# Patient Record
Sex: MALE | Race: OTHER | NOT HISPANIC OR LATINO | Employment: FULL TIME | URBAN - METROPOLITAN AREA
[De-identification: names, ages, dates, MRNs, and addresses within clinical notes are randomized per-mention and may not be internally consistent; named-entity substitution may affect disease eponyms.]

---

## 2019-11-18 ENCOUNTER — HOSPITAL ENCOUNTER (INPATIENT)
Facility: HOSPITAL | Age: 44
LOS: 1 days | Discharge: HOME | End: 2019-11-19
Attending: INTERNAL MEDICINE | Admitting: INTERNAL MEDICINE

## 2019-11-18 ENCOUNTER — APPOINTMENT (OUTPATIENT)
Dept: CARDIOLOGY | Facility: HOSPITAL | Age: 44
End: 2019-11-18

## 2019-11-18 DIAGNOSIS — I21.29 POSTERIOR MI (HCC): Primary | ICD-10-CM

## 2019-11-18 LAB
ANION GAP SERPL CALCULATED.3IONS-SCNC: 13.8 MMOL/L (ref 5–15)
AORTIC DIMENSIONLESS INDEX: 0.8 (DI)
BH CV ECHO MEAS - ACS: 1.8 CM
BH CV ECHO MEAS - AO MAX PG (FULL): 2.5 MMHG
BH CV ECHO MEAS - AO MAX PG: 5.1 MMHG
BH CV ECHO MEAS - AO MEAN PG (FULL): 1 MMHG
BH CV ECHO MEAS - AO MEAN PG: 3 MMHG
BH CV ECHO MEAS - AO ROOT AREA (BSA CORRECTED): 1.6
BH CV ECHO MEAS - AO ROOT AREA: 8 CM^2
BH CV ECHO MEAS - AO ROOT DIAM: 3.2 CM
BH CV ECHO MEAS - AO V2 MAX: 113 CM/SEC
BH CV ECHO MEAS - AO V2 MEAN: 82.1 CM/SEC
BH CV ECHO MEAS - AO V2 VTI: 24.9 CM
BH CV ECHO MEAS - AVA(I,A): 2.8 CM^2
BH CV ECHO MEAS - AVA(I,D): 2.8 CM^2
BH CV ECHO MEAS - AVA(V,A): 2.5 CM^2
BH CV ECHO MEAS - AVA(V,D): 2.5 CM^2
BH CV ECHO MEAS - BSA(HAYCOCK): 2 M^2
BH CV ECHO MEAS - BSA: 2 M^2
BH CV ECHO MEAS - BZI_BMI: 25.9 KILOGRAMS/M^2
BH CV ECHO MEAS - BZI_METRIC_HEIGHT: 178 CM
BH CV ECHO MEAS - BZI_METRIC_WEIGHT: 82 KG
BH CV ECHO MEAS - EDV(CUBED): 117.6 ML
BH CV ECHO MEAS - EDV(MOD-SP2): 62 ML
BH CV ECHO MEAS - EDV(MOD-SP4): 48 ML
BH CV ECHO MEAS - EDV(TEICH): 112.8 ML
BH CV ECHO MEAS - EF(CUBED): 77.1 %
BH CV ECHO MEAS - EF(MOD-BP): 63 %
BH CV ECHO MEAS - EF(MOD-SP2): 62.9 %
BH CV ECHO MEAS - EF(MOD-SP4): 68.8 %
BH CV ECHO MEAS - EF(TEICH): 69 %
BH CV ECHO MEAS - ESV(CUBED): 27 ML
BH CV ECHO MEAS - ESV(MOD-SP2): 23 ML
BH CV ECHO MEAS - ESV(MOD-SP4): 15 ML
BH CV ECHO MEAS - ESV(TEICH): 35 ML
BH CV ECHO MEAS - FS: 38.8 %
BH CV ECHO MEAS - IVS/LVPW: 1.1
BH CV ECHO MEAS - IVSD: 1 CM
BH CV ECHO MEAS - LAT PEAK E' VEL: 7.7 CM/SEC
BH CV ECHO MEAS - LV DIASTOLIC VOL/BSA (35-75): 24 ML/M^2
BH CV ECHO MEAS - LV MASS(C)D: 164.3 GRAMS
BH CV ECHO MEAS - LV MASS(C)DI: 82.1 GRAMS/M^2
BH CV ECHO MEAS - LV MAX PG: 2.6 MMHG
BH CV ECHO MEAS - LV MEAN PG: 2 MMHG
BH CV ECHO MEAS - LV SYSTOLIC VOL/BSA (12-30): 7.5 ML/M^2
BH CV ECHO MEAS - LV V1 MAX: 80.5 CM/SEC
BH CV ECHO MEAS - LV V1 MEAN: 58.5 CM/SEC
BH CV ECHO MEAS - LV V1 VTI: 20 CM
BH CV ECHO MEAS - LVIDD: 4.9 CM
BH CV ECHO MEAS - LVIDS: 3 CM
BH CV ECHO MEAS - LVLD AP2: 7.9 CM
BH CV ECHO MEAS - LVLD AP4: 6.6 CM
BH CV ECHO MEAS - LVLS AP2: 6.5 CM
BH CV ECHO MEAS - LVLS AP4: 4 CM
BH CV ECHO MEAS - LVOT AREA (M): 3.5 CM^2
BH CV ECHO MEAS - LVOT AREA: 3.5 CM^2
BH CV ECHO MEAS - LVOT DIAM: 2.1 CM
BH CV ECHO MEAS - LVPWD: 0.9 CM
BH CV ECHO MEAS - MED PEAK E' VEL: 7.07 CM/SEC
BH CV ECHO MEAS - MV A DUR: 145 SEC
BH CV ECHO MEAS - MV A MAX VEL: 98 CM/SEC
BH CV ECHO MEAS - MV DEC SLOPE: 601 CM/SEC^2
BH CV ECHO MEAS - MV DEC TIME: 180 SEC
BH CV ECHO MEAS - MV E MAX VEL: 72.3 CM/SEC
BH CV ECHO MEAS - MV E/A: 0.74
BH CV ECHO MEAS - MV MAX PG: 3.1 MMHG
BH CV ECHO MEAS - MV MEAN PG: 1 MMHG
BH CV ECHO MEAS - MV P1/2T MAX VEL: 84.2 CM/SEC
BH CV ECHO MEAS - MV P1/2T: 41 MSEC
BH CV ECHO MEAS - MV V2 MAX: 87.5 CM/SEC
BH CV ECHO MEAS - MV V2 MEAN: 55.6 CM/SEC
BH CV ECHO MEAS - MV V2 VTI: 22.9 CM
BH CV ECHO MEAS - MVA P1/2T LCG: 2.6 CM^2
BH CV ECHO MEAS - MVA(P1/2T): 5.4 CM^2
BH CV ECHO MEAS - MVA(VTI): 3 CM^2
BH CV ECHO MEAS - PA ACC TIME: 0.06 SEC
BH CV ECHO MEAS - PA MAX PG (FULL): -0.36 MMHG
BH CV ECHO MEAS - PA MAX PG: 3.1 MMHG
BH CV ECHO MEAS - PA PR(ACCEL): 50.7 MMHG
BH CV ECHO MEAS - PA V2 MAX: 88.2 CM/SEC
BH CV ECHO MEAS - PVA(V,A): 3.3 CM^2
BH CV ECHO MEAS - PVA(V,D): 3.3 CM^2
BH CV ECHO MEAS - QP/QS: 0.89
BH CV ECHO MEAS - RAP SYSTOLE: 3 MMHG
BH CV ECHO MEAS - RV MAX PG: 3.5 MMHG
BH CV ECHO MEAS - RV MEAN PG: 2 MMHG
BH CV ECHO MEAS - RV V1 MAX: 93.1 CM/SEC
BH CV ECHO MEAS - RV V1 MEAN: 60.9 CM/SEC
BH CV ECHO MEAS - RV V1 VTI: 19.7 CM
BH CV ECHO MEAS - RVOT AREA: 3.1 CM^2
BH CV ECHO MEAS - RVOT DIAM: 2 CM
BH CV ECHO MEAS - RVSP: 17 MMHG
BH CV ECHO MEAS - SI(AO): 100.1 ML/M^2
BH CV ECHO MEAS - SI(CUBED): 45.3 ML/M^2
BH CV ECHO MEAS - SI(LVOT): 34.6 ML/M^2
BH CV ECHO MEAS - SI(MOD-SP2): 19.5 ML/M^2
BH CV ECHO MEAS - SI(MOD-SP4): 16.5 ML/M^2
BH CV ECHO MEAS - SI(TEICH): 38.9 ML/M^2
BH CV ECHO MEAS - SV(AO): 200.3 ML
BH CV ECHO MEAS - SV(CUBED): 90.6 ML
BH CV ECHO MEAS - SV(LVOT): 69.3 ML
BH CV ECHO MEAS - SV(MOD-SP2): 39 ML
BH CV ECHO MEAS - SV(MOD-SP4): 33 ML
BH CV ECHO MEAS - SV(RVOT): 61.9 ML
BH CV ECHO MEAS - SV(TEICH): 77.8 ML
BH CV ECHO MEAS - TAPSE (>1.6): 2.4 CM2
BH CV ECHO MEAS - TR MAX PG: 14
BH CV ECHO MEAS - TR MAX VEL: 189 CM/SEC
BH CV ECHO MEASUREMENTS AVERAGE E/E' RATIO: 9.79
BH CV XLRA - RV BASE: 3 CM
BH CV XLRA - TDI S': 11 CM/SEC
BUN BLD-MCNC: 5 MG/DL (ref 6–20)
BUN/CREAT SERPL: 6.8 (ref 7–25)
CALCIUM SPEC-SCNC: 8.5 MG/DL (ref 8.6–10.5)
CHLORIDE SERPL-SCNC: 100 MMOL/L (ref 98–107)
CO2 SERPL-SCNC: 24.2 MMOL/L (ref 22–29)
CREAT BLD-MCNC: 0.74 MG/DL (ref 0.76–1.27)
GFR SERPL CREATININE-BSD FRML MDRD: 116 ML/MIN/1.73
GLUCOSE BLD-MCNC: 134 MG/DL (ref 65–99)
GLUCOSE BLDC GLUCOMTR-MCNC: 126 MG/DL (ref 70–130)
GLUCOSE BLDC GLUCOMTR-MCNC: 128 MG/DL (ref 70–130)
GLUCOSE BLDC GLUCOMTR-MCNC: 145 MG/DL (ref 70–130)
LEFT ATRIUM VOLUME INDEX: 11.5 ML/M2
LV EF 2D ECHO EST: 63 %
MAGNESIUM SERPL-MCNC: 1.8 MG/DL (ref 1.6–2.6)
MAXIMAL PREDICTED HEART RATE: 178 BPM
POTASSIUM BLD-SCNC: 3.5 MMOL/L (ref 3.5–5.2)
POTASSIUM BLD-SCNC: 3.6 MMOL/L (ref 3.5–5.2)
SODIUM BLD-SCNC: 138 MMOL/L (ref 136–145)
STRESS TARGET HR: 151 BPM

## 2019-11-18 PROCEDURE — 99222 1ST HOSP IP/OBS MODERATE 55: CPT | Performed by: INTERNAL MEDICINE

## 2019-11-18 PROCEDURE — C1894 INTRO/SHEATH, NON-LASER: HCPCS | Performed by: INTERNAL MEDICINE

## 2019-11-18 PROCEDURE — 25010000002 MAGNESIUM SULFATE 2 GM/50ML SOLUTION: Performed by: INTERNAL MEDICINE

## 2019-11-18 PROCEDURE — C1725 CATH, TRANSLUMIN NON-LASER: HCPCS | Performed by: INTERNAL MEDICINE

## 2019-11-18 PROCEDURE — 82962 GLUCOSE BLOOD TEST: CPT

## 2019-11-18 PROCEDURE — C1769 GUIDE WIRE: HCPCS | Performed by: INTERNAL MEDICINE

## 2019-11-18 PROCEDURE — 25010000002 HEPARIN (PORCINE) PER 1000 UNITS: Performed by: INTERNAL MEDICINE

## 2019-11-18 PROCEDURE — 93005 ELECTROCARDIOGRAM TRACING: CPT | Performed by: INTERNAL MEDICINE

## 2019-11-18 PROCEDURE — 83735 ASSAY OF MAGNESIUM: CPT | Performed by: INTERNAL MEDICINE

## 2019-11-18 PROCEDURE — C1874 STENT, COATED/COV W/DEL SYS: HCPCS | Performed by: INTERNAL MEDICINE

## 2019-11-18 PROCEDURE — C1887 CATHETER, GUIDING: HCPCS | Performed by: INTERNAL MEDICINE

## 2019-11-18 PROCEDURE — 93458 L HRT ARTERY/VENTRICLE ANGIO: CPT | Performed by: INTERNAL MEDICINE

## 2019-11-18 PROCEDURE — 84132 ASSAY OF SERUM POTASSIUM: CPT | Performed by: INTERNAL MEDICINE

## 2019-11-18 PROCEDURE — 93010 ELECTROCARDIOGRAM REPORT: CPT | Performed by: INTERNAL MEDICINE

## 2019-11-18 PROCEDURE — 99152 MOD SED SAME PHYS/QHP 5/>YRS: CPT | Performed by: INTERNAL MEDICINE

## 2019-11-18 PROCEDURE — 80048 BASIC METABOLIC PNL TOTAL CA: CPT | Performed by: INTERNAL MEDICINE

## 2019-11-18 PROCEDURE — 027034Z DILATION OF CORONARY ARTERY, ONE ARTERY WITH DRUG-ELUTING INTRALUMINAL DEVICE, PERCUTANEOUS APPROACH: ICD-10-PCS | Performed by: INTERNAL MEDICINE

## 2019-11-18 PROCEDURE — 93306 TTE W/DOPPLER COMPLETE: CPT

## 2019-11-18 PROCEDURE — 92941 PRQ TRLML REVSC TOT OCCL AMI: CPT | Performed by: INTERNAL MEDICINE

## 2019-11-18 PROCEDURE — 0 IOPAMIDOL PER 1 ML: Performed by: INTERNAL MEDICINE

## 2019-11-18 PROCEDURE — B2111ZZ FLUOROSCOPY OF MULTIPLE CORONARY ARTERIES USING LOW OSMOLAR CONTRAST: ICD-10-PCS | Performed by: INTERNAL MEDICINE

## 2019-11-18 PROCEDURE — 25010000002 MIDAZOLAM PER 1 MG: Performed by: INTERNAL MEDICINE

## 2019-11-18 PROCEDURE — 99153 MOD SED SAME PHYS/QHP EA: CPT | Performed by: INTERNAL MEDICINE

## 2019-11-18 PROCEDURE — 85347 COAGULATION TIME ACTIVATED: CPT

## 2019-11-18 PROCEDURE — 93306 TTE W/DOPPLER COMPLETE: CPT | Performed by: INTERNAL MEDICINE

## 2019-11-18 PROCEDURE — 25010000002 MORPHINE PER 10 MG: Performed by: INTERNAL MEDICINE

## 2019-11-18 PROCEDURE — 4A023N7 MEASUREMENT OF CARDIAC SAMPLING AND PRESSURE, LEFT HEART, PERCUTANEOUS APPROACH: ICD-10-PCS | Performed by: INTERNAL MEDICINE

## 2019-11-18 PROCEDURE — C9606 PERC D-E COR REVASC W AMI S: HCPCS | Performed by: INTERNAL MEDICINE

## 2019-11-18 PROCEDURE — 25010000002 FENTANYL CITRATE (PF) 100 MCG/2ML SOLUTION: Performed by: INTERNAL MEDICINE

## 2019-11-18 DEVICE — XIENCE SIERRA™ EVEROLIMUS ELUTING CORONARY STENT SYSTEM 2.50 MM X 28 MM / RAPID-EXCHANGE
Type: IMPLANTABLE DEVICE | Status: FUNCTIONAL
Brand: XIENCE SIERRA™

## 2019-11-18 RX ORDER — HEPARIN SODIUM 1000 [USP'U]/ML
INJECTION, SOLUTION INTRAVENOUS; SUBCUTANEOUS AS NEEDED
Status: DISCONTINUED | OUTPATIENT
Start: 2019-11-18 | End: 2019-11-18 | Stop reason: HOSPADM

## 2019-11-18 RX ORDER — HYDROCODONE BITARTRATE AND ACETAMINOPHEN 5; 325 MG/1; MG/1
1 TABLET ORAL EVERY 4 HOURS PRN
Status: DISCONTINUED | OUTPATIENT
Start: 2019-11-18 | End: 2019-11-19 | Stop reason: HOSPADM

## 2019-11-18 RX ORDER — ACETAMINOPHEN 325 MG/1
650 TABLET ORAL EVERY 4 HOURS PRN
Status: DISCONTINUED | OUTPATIENT
Start: 2019-11-18 | End: 2019-11-19 | Stop reason: HOSPADM

## 2019-11-18 RX ORDER — FENTANYL CITRATE 50 UG/ML
INJECTION, SOLUTION INTRAMUSCULAR; INTRAVENOUS AS NEEDED
Status: DISCONTINUED | OUTPATIENT
Start: 2019-11-18 | End: 2019-11-18 | Stop reason: HOSPADM

## 2019-11-18 RX ORDER — SODIUM CHLORIDE 9 MG/ML
100 INJECTION, SOLUTION INTRAVENOUS CONTINUOUS
Status: ACTIVE | OUTPATIENT
Start: 2019-11-18 | End: 2019-11-18

## 2019-11-18 RX ORDER — MAGNESIUM SULFATE HEPTAHYDRATE 40 MG/ML
2 INJECTION, SOLUTION INTRAVENOUS AS NEEDED
Status: DISCONTINUED | OUTPATIENT
Start: 2019-11-18 | End: 2019-11-19 | Stop reason: HOSPADM

## 2019-11-18 RX ORDER — ALPRAZOLAM 0.25 MG/1
0.25 TABLET ORAL 4 TIMES DAILY PRN
Status: DISCONTINUED | OUTPATIENT
Start: 2019-11-18 | End: 2019-11-19 | Stop reason: HOSPADM

## 2019-11-18 RX ORDER — METOPROLOL TARTRATE 5 MG/5ML
INJECTION INTRAVENOUS AS NEEDED
Status: DISCONTINUED | OUTPATIENT
Start: 2019-11-18 | End: 2019-11-18 | Stop reason: HOSPADM

## 2019-11-18 RX ORDER — LIDOCAINE HYDROCHLORIDE 20 MG/ML
INJECTION, SOLUTION INFILTRATION; PERINEURAL AS NEEDED
Status: DISCONTINUED | OUTPATIENT
Start: 2019-11-18 | End: 2019-11-18 | Stop reason: HOSPADM

## 2019-11-18 RX ORDER — POTASSIUM CHLORIDE 750 MG/1
40 CAPSULE, EXTENDED RELEASE ORAL AS NEEDED
Status: DISCONTINUED | OUTPATIENT
Start: 2019-11-18 | End: 2019-11-19 | Stop reason: HOSPADM

## 2019-11-18 RX ORDER — MORPHINE SULFATE 2 MG/ML
1 INJECTION, SOLUTION INTRAMUSCULAR; INTRAVENOUS EVERY 4 HOURS PRN
Status: DISCONTINUED | OUTPATIENT
Start: 2019-11-18 | End: 2019-11-19 | Stop reason: HOSPADM

## 2019-11-18 RX ORDER — NICOTINE 21 MG/24HR
1 PATCH, TRANSDERMAL 24 HOURS TRANSDERMAL
Status: DISCONTINUED | OUTPATIENT
Start: 2019-11-18 | End: 2019-11-19 | Stop reason: HOSPADM

## 2019-11-18 RX ORDER — MAGNESIUM SULFATE 1 G/100ML
1 INJECTION INTRAVENOUS AS NEEDED
Status: DISCONTINUED | OUTPATIENT
Start: 2019-11-18 | End: 2019-11-19 | Stop reason: HOSPADM

## 2019-11-18 RX ORDER — NALOXONE HCL 0.4 MG/ML
0.4 VIAL (ML) INJECTION
Status: DISCONTINUED | OUTPATIENT
Start: 2019-11-18 | End: 2019-11-19 | Stop reason: HOSPADM

## 2019-11-18 RX ORDER — ASPIRIN 81 MG/1
81 TABLET, CHEWABLE ORAL DAILY
Status: DISCONTINUED | OUTPATIENT
Start: 2019-11-19 | End: 2019-11-19 | Stop reason: HOSPADM

## 2019-11-18 RX ORDER — SODIUM CHLORIDE 9 MG/ML
INJECTION, SOLUTION INTRAVENOUS CONTINUOUS PRN
Status: COMPLETED | OUTPATIENT
Start: 2019-11-18 | End: 2019-11-18

## 2019-11-18 RX ORDER — MAGNESIUM SULFATE HEPTAHYDRATE 40 MG/ML
4 INJECTION, SOLUTION INTRAVENOUS AS NEEDED
Status: DISCONTINUED | OUTPATIENT
Start: 2019-11-18 | End: 2019-11-19 | Stop reason: HOSPADM

## 2019-11-18 RX ORDER — CLOPIDOGREL BISULFATE 75 MG/1
75 TABLET ORAL DAILY
Status: DISCONTINUED | OUTPATIENT
Start: 2019-11-19 | End: 2019-11-19 | Stop reason: HOSPADM

## 2019-11-18 RX ORDER — CLOPIDOGREL BISULFATE 75 MG/1
TABLET ORAL AS NEEDED
Status: DISCONTINUED | OUTPATIENT
Start: 2019-11-18 | End: 2019-11-18 | Stop reason: HOSPADM

## 2019-11-18 RX ORDER — ATORVASTATIN CALCIUM 20 MG/1
40 TABLET, FILM COATED ORAL NIGHTLY
Status: DISCONTINUED | OUTPATIENT
Start: 2019-11-18 | End: 2019-11-19 | Stop reason: HOSPADM

## 2019-11-18 RX ORDER — MIDAZOLAM HYDROCHLORIDE 1 MG/ML
INJECTION INTRAMUSCULAR; INTRAVENOUS AS NEEDED
Status: DISCONTINUED | OUTPATIENT
Start: 2019-11-18 | End: 2019-11-18 | Stop reason: HOSPADM

## 2019-11-18 RX ADMIN — MORPHINE SULFATE 1 MG: 2 INJECTION, SOLUTION INTRAMUSCULAR; INTRAVENOUS at 18:43

## 2019-11-18 RX ADMIN — SODIUM CHLORIDE 100 ML/HR: 9 INJECTION, SOLUTION INTRAVENOUS at 13:27

## 2019-11-18 RX ADMIN — POTASSIUM CHLORIDE 40 MEQ: 750 CAPSULE, EXTENDED RELEASE ORAL at 20:26

## 2019-11-18 RX ADMIN — METOPROLOL TARTRATE 25 MG: 25 TABLET ORAL at 13:27

## 2019-11-18 RX ADMIN — ATORVASTATIN CALCIUM 40 MG: 20 TABLET, FILM COATED ORAL at 20:26

## 2019-11-18 RX ADMIN — MORPHINE SULFATE 1 MG: 2 INJECTION, SOLUTION INTRAMUSCULAR; INTRAVENOUS at 22:44

## 2019-11-18 RX ADMIN — MAGNESIUM SULFATE 2 G: 2 INJECTION INTRAVENOUS at 17:14

## 2019-11-18 RX ADMIN — ALPRAZOLAM 0.25 MG: 0.25 TABLET ORAL at 16:21

## 2019-11-18 RX ADMIN — POTASSIUM CHLORIDE 40 MEQ: 750 CAPSULE, EXTENDED RELEASE ORAL at 17:14

## 2019-11-18 RX ADMIN — HYDROCODONE BITARTRATE AND ACETAMINOPHEN 1 TABLET: 5; 325 TABLET ORAL at 18:31

## 2019-11-18 RX ADMIN — NICOTINE 1 PATCH: 21 PATCH, EXTENDED RELEASE TRANSDERMAL at 14:00

## 2019-11-18 RX ADMIN — HYDROCODONE BITARTRATE AND ACETAMINOPHEN 1 TABLET: 5; 325 TABLET ORAL at 13:34

## 2019-11-18 RX ADMIN — METOPROLOL TARTRATE 25 MG: 25 TABLET ORAL at 20:26

## 2019-11-18 NOTE — PLAN OF CARE
Problem: Patient Care Overview  Goal: Plan of Care Review  Outcome: Ongoing (interventions implemented as appropriate)   11/18/19 1847   Coping/Psychosocial   Plan of Care Reviewed With patient   Plan of Care Review   Progress improving   OTHER   Outcome Summary Patient admitted as STAT flight from [facility] with STEMI. Taken to cath lab, REGINALDO placed to Left Cx. Pt arrived up to CCU hypertensive w/ 2/10 chest pain. Treated with lortab PRN. Given PO lopressor. Right radial site doing well w/ comfort band removed. Nicotine patch applied. Continues to complain repeatedly about not being able to go to park and smoke. Continues to discuss leaving hospital AMA. Continues to complain of vague 2/10 chest pain in middle of chest. Potassium and Magnesium low, being replaced. Having runs of V-tach, longed 25 beats. Dr. Hawkins aware. Continue to monitor in unit.     Goal: Individualization and Mutuality  Outcome: Ongoing (interventions implemented as appropriate)   11/18/19 1847   Individualization   Patient Specific Goals (Include Timeframe) No further chest pain. Stay in hospital. No further arrhythmia.   Patient Specific Interventions Meds and treatments as ordered.       Problem: Pain, Chronic (Adult)  Goal: Identify Related Risk Factors and Signs and Symptoms  Outcome: Outcome(s) achieved Date Met: 11/18/19 11/18/19 1847   Pain, Chronic (Adult)   Related Risk Factors (Chronic Pain) physical disability;procedures/treatments   Signs and Symptoms (Chronic Pain) verbalization of pain/discomfort for a prolonged time period;questions meaning of pain;verbalization of pain descriptors;pacing/restlessness;impaired thought process/concentration;guarding behavior/splinting/limp     Goal: Acceptable Pain/Comfort Level and Functional Ability  Outcome: Ongoing (interventions implemented as appropriate)   11/18/19 1847   Pain, Chronic (Adult)   Acceptable Pain/Comfort Level and Functional Ability making progress toward outcome        Problem: Cardiac Catheterization (Diagnostic/Interventional) (Adult)  Goal: Signs and Symptoms of Listed Potential Problems Will be Absent, Minimized or Managed (Cardiac Catheterization)  Outcome: Ongoing (interventions implemented as appropriate)   11/18/19 1847   Goal/Outcome Evaluation   Problems Assessed (Cardiac Catheterization) all   Problems Present (Cardiac Cath) situational response;cardiopulmonary complications     Goal: Anesthesia/Sedation Recovery  Outcome: Outcome(s) achieved Date Met: 11/18/19 11/18/19 1847   Goal/Outcome Evaluation   Anesthesia/Sedation Recovery criteria met for transfer       Problem: Skin Injury Risk (Adult)  Goal: Skin Health and Integrity  Outcome: Ongoing (interventions implemented as appropriate)   11/18/19 1847   Skin Injury Risk (Adult)   Skin Health and Integrity making progress toward outcome

## 2019-11-18 NOTE — NURSING NOTE
Pt having multiple runs of Ventricular tachycardia - see printed strips. Longest 25 beats.     12 lead EKG done, no change. Stat mag and potassium sent. CLEMENTINA Martell with Dr. Hawkins notified.    Electrolytes low, orders received for replacement. Per CLEMENTINA Oreilly - Dr. Hawkins thinks these are reperfusion arrhythmias, just monitor.

## 2019-11-18 NOTE — H&P
[facility] History And Physical Assessment    Patient Name: Isaac Holley  Age/Sex: 44 y.o. male  : 1975  MRN: [id number]    Date of Hospital Admission: 2019  Date of Encounter Visit: 19  Encounter Provider: Roselyn Hawkins MD  Place of Service: [facility] CARDIOLOGY  Patient Care Team:  Provider, No Known as PCP - General  Provider, No Known as PCP - Family Medicine    Subjective:     Chief Complaint:  Posterior myocardial infarction    History of Present Illness:  Isaac Holley is a 44 y.o. male with a history of tobacco use, untreated hypertension, chronic back pain who presents with a posterior myocardial infarction.     The patient reports he had sudden onset of left sided chest pain that started while he was at work.  He denies doing anything strenuous when the pain started.  He reports similar pain several years ago but nothing recent.  He presented to the [facility] emergency room where an EKG showed evidence of inferolateral ST elevations and anterior ST depressions.  Interventional cardiology was not available at the facility at that time so he was transferred emergently by [employer] to our facility.  Upon arrival he was brought directly to the cardiac catheterization laboratory and coronary angiograms showed 100% occlusion of the distal left circumflex artery for which he underwent drug eluting stent placement.  His remaining coronary arteries showed mild non-obstructive coronary artery disease.      He denies any prior cardiac history.  He reports a prior history of hypertension but he no longer takes medications for this at this time.      Past Medical History:  1. Prior hypertension    Past Surgical History:  1. Back surgery    Home Medications:   No prescription medications.  Takes an herbal pain medication for lower back pain.     Allergies:  No Known Allergies    Past Social History:  Social History     Socioeconomic  History   • Marital status:      Spouse name: Not on file   • Number of children: Not on file   • Years of education: Not on file   • Highest education level: Not on file       Past Family History:  No family history on file.    Review of Systems:   All systems reviewed. Pertinent positives identified in HPI. All other systems are negative.    Objective:   Resp:  [18-22] 20    Intake/Output Summary (Last 24 hours) at 11/18/2019 1244  Last data filed at 11/18/2019 1241  Gross per 24 hour   Intake 600 ml   Output --   Net 600 ml     Body mass index is 26.07 kg/m².      11/18/19  1220   Weight: 82.4 kg (181 lb 10.5 oz)     Weight change:     Physical Exam:   General Appearance:    Alert, cooperative, in no acute distress   Head:    Normocephalic, without obvious abnormality, atraumatic   Eyes:            Lids and lashes normal, conjunctivae and sclerae normal, no   icterus, no pallor, corneas clear, PERRLA   Ears:    Ears appear intact with no abnormalities noted   Neck:   No adenopathy, supple, trachea midline, no thyromegaly, no   carotid bruit, no JVD   Lungs:     Clear to auscultation,respirations regular, even and unlabored    Heart:    Regular rhythm and normal rate, normal S1 and S2, no murmur, no gallop, no rub, no click   Chest Wall:    No abnormalities observed   Abdomen:     Normal bowel sounds, no masses, no organomegaly, soft        non-tender, non-distended, no guarding, no rebound  tenderness   Extremities:   Moves all extremities well, no edema, no cyanosis, no redness   Pulses:   Pulses palpable and equal bilaterally. Normal radial, carotid, femoral, dorsalis pedis and posterior tibial pulses bilaterally. Normal abdominal aorta   Skin:  Psychiatric:   No bleeding, bruising or rash    Alert and oriented x 3, normal mood and affect         Lab Review:   Labs from [facility] reviewed.     I personally viewed and interpreted the patient's EKG    Assessment/Plan:     1. Posterior myocardial infarction.   Status post drug eluting stent placement of the distal left circumflex artery.   2. Coronary artery disease.  Mild non-obstructive disease of the remaining coronary arteries.   3. Hypertension.  Untreated.   4. Tobacco use    -  Continue aspirin and clopidogrel  -  Will start beta blockers and statin  -  Check echocardiogram  -  Start nicotine patch    Roselyn Hawkins MD  11/18/19  12:44 PM

## 2019-11-19 VITALS
HEART RATE: 86 BPM | HEIGHT: 70 IN | TEMPERATURE: 98 F | OXYGEN SATURATION: 97 % | BODY MASS INDEX: 26.57 KG/M2 | WEIGHT: 185.63 LBS | RESPIRATION RATE: 16 BRPM | DIASTOLIC BLOOD PRESSURE: 101 MMHG | SYSTOLIC BLOOD PRESSURE: 134 MMHG

## 2019-11-19 LAB
ACT BLD: 180 SECONDS (ref 82–152)
ACT BLD: 290 SECONDS (ref 82–152)
ACT BLD: 313 SECONDS (ref 82–152)
ANION GAP SERPL CALCULATED.3IONS-SCNC: 9.2 MMOL/L (ref 5–15)
BUN BLD-MCNC: 6 MG/DL (ref 6–20)
BUN/CREAT SERPL: 8.2 (ref 7–25)
CALCIUM SPEC-SCNC: 8.2 MG/DL (ref 8.6–10.5)
CHLORIDE SERPL-SCNC: 105 MMOL/L (ref 98–107)
CHOLEST SERPL-MCNC: 187 MG/DL (ref 0–200)
CO2 SERPL-SCNC: 25.8 MMOL/L (ref 22–29)
CREAT BLD-MCNC: 0.73 MG/DL (ref 0.76–1.27)
DEPRECATED RDW RBC AUTO: 41.4 FL (ref 37–54)
ERYTHROCYTE [DISTWIDTH] IN BLOOD BY AUTOMATED COUNT: 11.9 % (ref 12.3–15.4)
GFR SERPL CREATININE-BSD FRML MDRD: 118 ML/MIN/1.73
GLUCOSE BLD-MCNC: 110 MG/DL (ref 65–99)
HBA1C MFR BLD: 5.33 % (ref 4.8–5.6)
HCT VFR BLD AUTO: 37.5 % (ref 37.5–51)
HDLC SERPL-MCNC: 34 MG/DL (ref 40–60)
HGB BLD-MCNC: 12.7 G/DL (ref 13–17.7)
LDLC SERPL CALC-MCNC: 119 MG/DL (ref 0–100)
LDLC/HDLC SERPL: 3.51 {RATIO}
MAGNESIUM SERPL-MCNC: 2.2 MG/DL (ref 1.6–2.6)
MCH RBC QN AUTO: 31.7 PG (ref 26.6–33)
MCHC RBC AUTO-ENTMCNC: 33.9 G/DL (ref 31.5–35.7)
MCV RBC AUTO: 93.5 FL (ref 79–97)
PLATELET # BLD AUTO: 304 10*3/MM3 (ref 140–450)
PMV BLD AUTO: 10 FL (ref 6–12)
POTASSIUM BLD-SCNC: 4.5 MMOL/L (ref 3.5–5.2)
RBC # BLD AUTO: 4.01 10*6/MM3 (ref 4.14–5.8)
SODIUM BLD-SCNC: 140 MMOL/L (ref 136–145)
TRIGL SERPL-MCNC: 169 MG/DL (ref 0–150)
TROPONIN T SERPL-MCNC: 4.74 NG/ML (ref 0–0.03)
VLDLC SERPL-MCNC: 33.8 MG/DL (ref 5–40)
WBC NRBC COR # BLD: 9.54 10*3/MM3 (ref 3.4–10.8)

## 2019-11-19 PROCEDURE — 85027 COMPLETE CBC AUTOMATED: CPT | Performed by: INTERNAL MEDICINE

## 2019-11-19 PROCEDURE — 83735 ASSAY OF MAGNESIUM: CPT | Performed by: INTERNAL MEDICINE

## 2019-11-19 PROCEDURE — 80048 BASIC METABOLIC PNL TOTAL CA: CPT | Performed by: INTERNAL MEDICINE

## 2019-11-19 PROCEDURE — 80061 LIPID PANEL: CPT | Performed by: INTERNAL MEDICINE

## 2019-11-19 PROCEDURE — 84484 ASSAY OF TROPONIN QUANT: CPT | Performed by: INTERNAL MEDICINE

## 2019-11-19 PROCEDURE — 93010 ELECTROCARDIOGRAM REPORT: CPT | Performed by: INTERNAL MEDICINE

## 2019-11-19 PROCEDURE — 93005 ELECTROCARDIOGRAM TRACING: CPT | Performed by: INTERNAL MEDICINE

## 2019-11-19 PROCEDURE — 99238 HOSP IP/OBS DSCHRG MGMT 30/<: CPT | Performed by: INTERNAL MEDICINE

## 2019-11-19 PROCEDURE — 83036 HEMOGLOBIN GLYCOSYLATED A1C: CPT | Performed by: INTERNAL MEDICINE

## 2019-11-19 RX ORDER — METOPROLOL SUCCINATE 25 MG/1
25 TABLET, EXTENDED RELEASE ORAL
Qty: 30 TABLET | Refills: 1 | Status: SHIPPED | OUTPATIENT
Start: 2019-11-19 | End: 2020-01-30 | Stop reason: SDUPTHER

## 2019-11-19 RX ORDER — ATORVASTATIN CALCIUM 40 MG/1
40 TABLET, FILM COATED ORAL NIGHTLY
Qty: 30 TABLET | Refills: 1 | Status: SHIPPED | OUTPATIENT
Start: 2019-11-19 | End: 2020-01-30 | Stop reason: SDUPTHER

## 2019-11-19 RX ORDER — ASPIRIN 81 MG/1
81 TABLET, CHEWABLE ORAL DAILY
Start: 2019-11-20

## 2019-11-19 RX ORDER — CLOPIDOGREL BISULFATE 75 MG/1
75 TABLET ORAL DAILY
Qty: 30 TABLET | Refills: 11 | Status: SHIPPED | OUTPATIENT
Start: 2019-11-20

## 2019-11-19 RX ORDER — METOPROLOL SUCCINATE 25 MG/1
25 TABLET, EXTENDED RELEASE ORAL
Status: DISCONTINUED | OUTPATIENT
Start: 2019-11-19 | End: 2019-11-19 | Stop reason: HOSPADM

## 2019-11-19 RX ADMIN — CLOPIDOGREL 75 MG: 75 TABLET, FILM COATED ORAL at 08:10

## 2019-11-19 RX ADMIN — NICOTINE 1 PATCH: 21 PATCH, EXTENDED RELEASE TRANSDERMAL at 08:10

## 2019-11-19 RX ADMIN — METOPROLOL SUCCINATE 25 MG: 25 TABLET, FILM COATED, EXTENDED RELEASE ORAL at 08:41

## 2019-11-19 RX ADMIN — ASPIRIN 81 MG: 81 TABLET, CHEWABLE ORAL at 08:10

## 2019-11-19 NOTE — DISCHARGE SUMMARY
[facility] Discharge    Patient Name: Isaac Holley  Age/Sex: 44 y.o. male  : 1975  MRN: [id number]    Encounter Provider: Roselyn Hawkins MD  Referring Provider: Roselyn Hawkins MD  [facility]  Patient Care Team:  Provider, No Known as PCP - General  Provider, No Known as PCP - Family Medicine         Date of Discharge:  2019     Date of Admit: 2019    Discharge Condition: Stable  Discharge Diagnosis:    Acute posterior myocardial infarction (CMS/HCC)      Hospital Course:   This is a 44-year-old with a history of tobacco use, chronic back pain, untreated hypertension, who was admitted with a posterior myocardial infarction.    He presented to [facility] with complaints of sudden onset chest pressure.  On arrival he was noted to have inferolateral ST elevations with anterior ST depressions.  Interventional cardiology was not available at facility at the time so the patient was transferred emergently by [facility] to [facility].  Upon arrival he was brought directly to cardiac catheterization laboratory.  Cardiac catheterization showed evidence of an occluded distal left circumflex vessel for which he underwent drug-eluting stent placement.  His remaining coronary arteries showed mild nonobstructive coronary artery disease.  Echocardiogram following his myocardial infarction showed normal left ventricular systolic function and wall motion with no significant valvular disease.  He did have episodes of nonsustained ventricular tachycardia following his myocardial infraction the longest lasting about 15 beats which were all asymptomatic.    Following his procedure the patient insisted on going outside to smoke.  He stated that he was unable to go without a cigarette for very long.  After explaining the situation the patient was able to stay the first night.  The morning following his  myocardial infarction the patient reported that he would not be able to stay any longer hospital.  I tried discussing this with the patient and discouraged him from leaving the hospital and explained that I cannot let him walk out of the hospital to smoke a cigarette.  The patient was insistent.  He will be discharged AGAINST MEDICAL ADVICE.  Since it is imperative that the patient be on medications for his recent myocardial infarction and stent placement I will provide prescriptions and follow-up appointment for him if he wishes to use them.    He was advised of the possibility of arrhythmias, recurrent stent thrombosis and myocardial infarction as some of the possible complications that could arise.  Was stressed the importance of taking his medications as directed especially his dual antiplatelet therapy.    Objective:  Temp:  [97.8 °F (36.6 °C)-98.9 °F (37.2 °C)] 98.5 °F (36.9 °C)  Heart Rate:  [58-82] 69  Resp:  [10-22] 15  BP: (121-170)/() 122/77    Intake/Output Summary (Last 24 hours) at 11/19/2019 0837  Last data filed at 11/19/2019 0404  Gross per 24 hour   Intake 1970 ml   Output 2000 ml   Net -30 ml     Body mass index is 26.57 kg/m².      11/18/19  1220 11/18/19  1529 11/19/19  0404   Weight: 82.4 kg (181 lb 10.5 oz) 82 kg (180 lb 12.4 oz) 84.2 kg (185 lb 10 oz)     Weight change:     Physical Exam:   General Appearance:    No acute distress, alert and oriented x4   Lungs:     Clear to auscultation bilaterally     Heart:    Regular rhythm and normal rate.  No murmurs, gallops, or       rubs.   Abdomen:     Soft, non-tender, non-distended.    Extremities:   Moves all extremities well.  No clubbing, cyanosis, or edema.       Procedures Performed  Procedure(s):  Left Heart Cath  Coronary angiography  Percutaneous Coronary Intervention       Consults:  Consults     No orders found for last 30 day(s).          Pertinent Test Results:  Results from last 7 days   Lab Units 11/19/19  0403 11/18/19  0003    SODIUM mmol/L 140 138   POTASSIUM mmol/L 4.5 3.6  3.5   CHLORIDE mmol/L 105 100   CO2 mmol/L 25.8 24.2   BUN mg/dL 6 5*   CREATININE mg/dL 0.73* 0.74*   GLUCOSE mg/dL 110* 134*   CALCIUM mg/dL 8.2* 8.5*     Results from last 7 days   Lab Units 11/19/19  0403   TROPONIN T ng/mL 4.740*     Results from last 7 days   Lab Units 11/19/19  0403   WBC 10*3/mm3 9.54   HEMOGLOBIN g/dL 12.7*   HEMATOCRIT % 37.5   PLATELETS 10*3/mm3 304         Results from last 7 days   Lab Units 11/19/19  0403 11/18/19  1554   MAGNESIUM mg/dL 2.2 1.8     Results from last 7 days   Lab Units 11/19/19  0403   CHOLESTEROL mg/dL 187   TRIGLYCERIDES mg/dL 169*   HDL CHOL mg/dL 34*               Discharge Medications     Discharge Medications      New Medications      Instructions Start Date   aspirin 81 MG chewable tablet   81 mg, Oral, Daily   Start Date:  11/20/2019     atorvastatin 40 MG tablet  Commonly known as:  LIPITOR   40 mg, Oral, Nightly      clopidogrel 75 MG tablet  Commonly known as:  PLAVIX   75 mg, Oral, Daily   Start Date:  11/20/2019     metoprolol succinate XL 25 MG 24 hr tablet  Commonly known as:  TOPROL-XL   25 mg, Oral, Every 24 Hours Scheduled         Continue These Medications      Instructions Start Date   NON FORMULARY   KRATOM (OTC)              Discharge Diet:    Dietary Orders (From admission, onward)    Start     Ordered    11/18/19 1317  Diet Regular; Cardiac  Diet Effective Now     Question Answer Comment   Diet Texture / Consistency Regular    Common Modifiers Cardiac        11/18/19 1317          Activity at Discharge:   as instructed    Discharge disposition: home     Discharge Instructions and Follow ups:  No future appointments.  Additional Instructions for the Follow-ups that You Need to Schedule     Ambulatory Referral to Cardiac Rehab   As directed      Discharge Follow-up with Specified Provider: WILL Mistry; 1 Week   As directed      To:  WILL Mistry    Follow Up:  1 Week          Discharge Follow-up with Specified Provider: Dr. Hawkins; 1 Month   As directed      To:  Dr. Hawkins    Follow Up:  1 Month           Follow-up Information     [facility] .    Specialty:  Cardiac Rehabilitation  Contact information:  [address] [facility] [id number] [phone]           Provider, No Known .    Contact information:  [facility] [id number]                      Roselyn Hawkins MD  11/19/19  8:37 AM    Time: Discharge 20 min

## 2019-11-19 NOTE — PAYOR COMM NOTE
"Scarlett Orellana (44 y.o. Male)                    ATTENTION;   CLINICAL FOR NURSE REVIEW, AUTH PENDING [id number]                 REPLY TO [facility] DEPT, DANYELL CAMPBELL N  UR  [phone]         Date of Birth Social Security Number Address Home Phone MRN    1975  [address]  [place] [address] [phone] [id number]    Zoroastrian Marital Status          None        Admission Date Admission Type Admitting Provider Attending Provider Department, Room/Bed    19 Urgent Roselyn Hawkins MD  [facility], N331/1    Discharge Date Discharge Disposition Discharge Destination        2019 Home or Self Care              Attending Provider:  (none)   Allergies:  No Known Allergies    Isolation:  None   Infection:  None   Code Status:  Prior    Ht:  178 cm (70.08\")   Wt:  84.2 kg (185 lb 10 oz)    Admission Cmt:  None   Principal Problem:  None                Active Insurance as of 2019     Primary Coverage     Payor Plan Insurance Group Employer/Plan Group    [place] MEDICAID [facility] MEDICAID [id number]     Payor Plan Address Payor Plan Phone Number Payor Plan Fax Number Effective Dates    [address] [address] [phone]  2019 - None Entered    [facility] [id number]       Subscriber Name Subscriber Birth Date Member ID       SCARLETT ORELLANA 1975 [id number]                 Emergency Contacts      (Rel.) Home Phone Work Phone Mobile Phone    STEVE ORELLANA (Mother) [phone] -- --    LEE MAHMOOD (Brother) [phone] -- --               History & Physical      Roselyn Hawkins MD at 19 [address] [place] [address] Cardiology History And Physical Assessment    Patient Name: Scarlett Orellana  Age/Sex: 44 y.o. male  : 1975  MRN: [id number]    Date of Hospital Admission: 2019  Date of Encounter Visit: 19  Encounter Provider: Roselyn Hawkins MD  Place of Service: [facility] " [place] [facility]  Patient Care Team:  Provider, No Known as PCP - General  Provider, No Known as PCP - Family Medicine    Subjective:     Chief Complaint:  Posterior myocardial infarction    History of Present Illness:  Isaac Holley is a 44 y.o. male with a history of tobacco use, untreated hypertension, chronic back pain who presents with a posterior myocardial infarction.     The patient reports he had sudden onset of left sided chest pain that started while he was at work.  He denies doing anything strenuous when the pain started.  He reports similar pain several years ago but nothing recent.  He presented to the [facility] emergency room where an EKG showed evidence of inferolateral ST elevations and anterior ST depressions.  Interventional cardiology was not available at the facility at that time so he was transferred emergently by [employer] to our facility.  Upon arrival he was brought directly to the cardiac catheterization laboratory and coronary angiograms showed 100% occlusion of the distal left circumflex artery for which he underwent drug eluting stent placement.  His remaining coronary arteries showed mild non-obstructive coronary artery disease.      He denies any prior cardiac history.  He reports a prior history of hypertension but he no longer takes medications for this at this time.      Past Medical History:  1. Prior hypertension    Past Surgical History:  1. Back surgery    Home Medications:   No prescription medications.  Takes an herbal pain medication for lower back pain.     Allergies:  No Known Allergies    Past Social History:  Social History     Socioeconomic History   • Marital status:      Spouse name: Not on file   • Number of children: Not on file   • Years of education: Not on file   • Highest education level: Not on file       Past Family History:  No family history on file.    Review of Systems:   All systems reviewed. Pertinent positives identified  in HPI. All other systems are negative.    Objective:   Resp:  [18-22] 20    Intake/Output Summary (Last 24 hours) at 11/18/2019 1244  Last data filed at 11/18/2019 1241  Gross per 24 hour   Intake 600 ml   Output --   Net 600 ml     Body mass index is 26.07 kg/m².      11/18/19  1220   Weight: 82.4 kg (181 lb 10.5 oz)     Weight change:     Physical Exam:   General Appearance:    Alert, cooperative, in no acute distress   Head:    Normocephalic, without obvious abnormality, atraumatic   Eyes:            Lids and lashes normal, conjunctivae and sclerae normal, no   icterus, no pallor, corneas clear, PERRLA   Ears:    Ears appear intact with no abnormalities noted   Neck:   No adenopathy, supple, trachea midline, no thyromegaly, no   carotid bruit, no JVD   Lungs:     Clear to auscultation,respirations regular, even and unlabored    Heart:    Regular rhythm and normal rate, normal S1 and S2, no murmur, no gallop, no rub, no click   Chest Wall:    No abnormalities observed   Abdomen:     Normal bowel sounds, no masses, no organomegaly, soft        non-tender, non-distended, no guarding, no rebound  tenderness   Extremities:   Moves all extremities well, no edema, no cyanosis, no redness   Pulses:   Pulses palpable and equal bilaterally. Normal radial, carotid, femoral, dorsalis pedis and posterior tibial pulses bilaterally. Normal abdominal aorta   Skin:  Psychiatric:   No bleeding, bruising or rash    Alert and oriented x 3, normal mood and affect         Lab Review:   Labs from [facility] reviewed.     I personally viewed and interpreted the patient's EKG    Assessment/Plan:     1. Posterior myocardial infarction.  Status post drug eluting stent placement of the distal left circumflex artery.   2. Coronary artery disease.  Mild non-obstructive disease of the remaining coronary arteries.   3. Hypertension.  Untreated.   4. Tobacco use    -  Continue aspirin and clopidogrel  -  Will start beta blockers and statin  -   Check echocardiogram  -  Start nicotine patch    Roselyn Hawkins MD  11/18/19  12:44 PM                      Electronically signed by Roselyn Hawkins MD at 11/18/19 1324            Vital Signs (last 3 days) before discharge     Date/Time   Temp   Temp src   Pulse   Resp   BP   Patient Position   SpO2    11/19/19 0841   98 (36.7)   Oral   86   16   134/101  (Abnormal)    Sitting   97    11/19/19 0808   --   --   --   --   --   --   96    11/19/19 0800   --   --   99   --   --   --   97    11/19/19 0700   --   --   69   --   --   --   96    11/19/19 0600   --   --   62   --   122/77   --   96    11/19/19 0500   --   --   62   --   128/86   --   96    11/19/19 0400   --   --   78   --   126/89   --   95    11/19/19 0351   98.5 (36.9)   Oral   --   --   --   --   --    11/19/19 0300   --   --   68   --   143/89   --   98    11/19/19 0200   --   --   58   --   127/84   --   95    11/19/19 0100   --   --   72   --   --   --   96    11/19/19 0000   --   --   61   15   128/88   Lying   95    11/18/19 2346   98.9 (37.2)   --   --   --   --   --   --    11/18/19 2300   --   --   58   --   140/93   --   96    11/18/19 2200   --   --   61   --   129/93   --   96    11/18/19 2100   --   --   61   --   125/85   --   95    11/18/19 2000   --   --   68   15   146/92   Lying   98    11/18/19 1920   --   --   76   --   --   --   97    11/18/19 1900   97.8 (36.6)   Oral   72   --   145/102  (Abnormal)    --   97    11/18/19 1809   --   --   81   --   --   --   98    11/18/19 1800   --   --   73   --   148/95   --   97    11/18/19 1730   --   --   82   --   155/106  (Abnormal)    --   99    11/18/19 1700   --   --   61   --   123/82   --   97    11/18/19 1630   --   --   --   --   121/80   --   --    11/18/19 1625   --   --   76   --   --   --   97    11/18/19 1611   98.6 (37)   Oral   --   --   --   --   --    11/18/19 1600   --   --   67   16   135/89   Lying   97    11/18/19 1530   --   --   --   --   --   --   98    11/18/19 1529    --   --   76   --   128/72   --   --    11/18/19 1500   --   --   --   --   146/104  (Abnormal)    --   --    11/18/19 1437   --   --   68   --   --   --   98    11/18/19 1430   --   --   66   --   127/87   --   97    11/18/19 1400   --   --   72   --   160/117  (Abnormal)    --   98    11/18/19 1345   --   --   --   --   156/112  (Abnormal)    --   --    11/18/19 1336   --   --   76   --   --   --   98    11/18/19 1330   --   --   68   --   142/104  (Abnormal)    --   97    11/18/19 1315   --   --   65   --   142/95   --   96    11/18/19 1305   98.1 (36.7)   Oral   80   10   170/118  (Abnormal)    Lying   95    11/18/19 12:44:34   --   --   --   20   --   --   --    11/18/19 12:39:57   --   --   --   20   --   --   --    11/18/19 12:35:21   --   --   --   20   --   --   --    11/18/19 12:30:50   --   --   --   20   --   --   --    11/18/19 12:25:57   --   --   --   20   --   --   --    11/18/19 12:21:26   --   --   --   20   --   --   --    11/18/19 12:16:43   --   --   --   20   --   --   --    11/18/19 12:12:08   --   --   --   20   --   --   --    11/18/19 12:07:10   --   --   --   20   --   --   --    11/18/19 12:04:03   --   --   --   18   --   --   --    11/18/19 11:58:53   --   --   --   22   --   --   --    11/18/19 11:54:21   --   --   --   20   --   --   --    11/18/19 11:49:36   --   --   --   20   --   --   --    11/18/19 11:44:52   --   --   --   20   --   --   --    11/18/19 11:40:17   --   --   --   20   --   --   --    11/18/19 11:35:13   --   --   --   22   --   --   --              Oxygen Therapy (last 3 days) before discharge     Date/Time   SpO2   Device (Oxygen Therapy)   Flow (L/min)   Oxygen Concentration (%)   ETCO2 (mmHg)    11/19/19 0841   97   room air   --   --   --    11/19/19 0808   96   --   --   --   --    11/19/19 0800   97   room air   --   --   --    11/19/19 0700   96   --   --   --   --    11/19/19 0600   96   --   --   --   --    11/19/19 0500   96   --   --   --   --     11/19/19 0400   95   --   --   --   --    11/19/19 0300   98   --   --   --   --    11/19/19 0200   95   --   --   --   --    11/19/19 0100   96   --   --   --   --    11/19/19 0030   --   room air   --   --   --    11/19/19 0000   95   room air   --   --   --    11/18/19 2300   96   --   --   --   --    11/18/19 2200   96   --   --   --   --    11/18/19 2100   95   --   --   --   --    11/18/19 2034   --   room air   --   --   --    11/18/19 2000   98   room air   --   --   --    11/18/19 1920   97   --   --   --   --    11/18/19 1900   97   --   --   --   --    11/18/19 1809   98   --   --   --   --    11/18/19 1800   97   --   --   --   --    11/18/19 1730   99   --   --   --   --    11/18/19 1700   97   --   --   --   --    11/18/19 1625   97   --   --   --   --    11/18/19 1600   97   room air   --   --   --    11/18/19 1530   98   --   --   --   --    11/18/19 1437   98   --   --   --   --    11/18/19 1430   97   --   --   --   --    11/18/19 1400   98   --   --   --   --    11/18/19 1336   98   --   --   --   --    11/18/19 1330   97   --   --   --   --    11/18/19 1315   96   --   --   --   --    11/18/19 1305   95   room air   --   --   --            Lines, Drains & Airways    Active LDAs     None         Inactive LDAs     Name:   Placement date:   Placement time:   Removal date:   Removal time:   Site:   Days:    [REMOVED] Peripheral IV 11/18/19 0941 Right Antecubital   11/18/19    0941    11/19/19    0830    Antecubital   less than 1    [REMOVED] Peripheral IV 11/18/19 0955 Right Hand   11/18/19    0955    11/19/19    0850    Hand   less than 1    [REMOVED] Arterial Sheath 6 Fr. Right Radial   11/18/19    1141    11/18/19    1243    Radial   less than 1                Hospital Medications (all)       Dose Frequency Start End    sodium chloride 0.9 % infusion  Continuous PRN 11/18/2019 11/18/2019    Sig: Continuous As Needed.    sodium chloride 0.9 % infusion 100 mL/hr Continuous 11/18/2019 11/18/2019     Sig - Route: Infuse 100 mL/hr into a venous catheter Continuous. - Intravenous    acetaminophen (TYLENOL) tablet 650 mg (Discontinued) 650 mg Every 4 Hours PRN 11/18/2019 11/19/2019    Sig - Route: Take 2 tablets by mouth Every 4 (Four) Hours As Needed for Mild Pain  or Fever (temperature greater than 101F). - Oral    Reason for Discontinue: Patient Discharge    ALPRAZolam (XANAX) tablet 0.25 mg (Discontinued) 0.25 mg 4 Times Daily PRN 11/18/2019 11/19/2019    Sig - Route: Take 1 tablet by mouth 4 (Four) Times a Day As Needed for Anxiety. - Oral    Reason for Discontinue: Patient Discharge    aspirin chewable tablet 81 mg (Discontinued) 81 mg Daily 11/19/2019 11/19/2019    Sig - Route: Chew 1 tablet Daily. - Oral    Reason for Discontinue: Patient Discharge    atorvastatin (LIPITOR) tablet 40 mg (Discontinued) 40 mg Nightly 11/18/2019 11/19/2019    Sig - Route: Take 2 tablets by mouth Every Night. - Oral    Reason for Discontinue: Patient Discharge    clopidogrel (PLAVIX) tablet 75 mg (Discontinued) 75 mg Daily 11/19/2019 11/19/2019    Sig - Route: Take 1 tablet by mouth Daily. - Oral    Reason for Discontinue: Patient Discharge    clopidogrel (PLAVIX) tablet (Discontinued)  As Needed 11/18/2019 11/18/2019    Sig: As Needed.    Reason for Discontinue: Patient Discharge    fentaNYL citrate (PF) (SUBLIMAZE) injection (Discontinued)  As Needed 11/18/2019 11/18/2019    Sig: As Needed.    Reason for Discontinue: Patient Discharge    heparin (porcine) injection (Discontinued)  As Needed 11/18/2019 11/18/2019    Sig: As Needed.    Reason for Discontinue: Patient Discharge    HYDROcodone-acetaminophen (NORCO) 5-325 MG per tablet 1 tablet (Discontinued) 1 tablet Every 4 Hours PRN 11/18/2019 11/19/2019    Sig - Route: Take 1 tablet by mouth Every 4 (Four) Hours As Needed for Moderate Pain . - Oral    Reason for Discontinue: Patient Discharge    iopamidol (ISOVUE-370) 76 % injection (Discontinued)  As Needed 11/18/2019  "11/18/2019    Sig: As Needed.    Reason for Discontinue: Patient Discharge    lidocaine (XYLOCAINE) 2% injection (Discontinued)  As Needed 11/18/2019 11/18/2019    Sig: As Needed.    Reason for Discontinue: Patient Discharge    Magnesium Sulfate 2 gram infusion- Mg 1.6 - 1.9 mg/dL (Discontinued) 2 g As Needed 11/18/2019 11/19/2019    Sig - Route: Infuse 50 mL into a venous catheter As Needed (Mg 1.6 - 1.9 mg/dL). - Intravenous    Reason for Discontinue: Patient Discharge    Linked Group 1:  \"Or\" Linked Group Details        magnesium sulfate 3 gram infusion (1gm x 3) - Mg 1.1 - 1.5 mg/dL (Discontinued) 1 g As Needed 11/18/2019 11/19/2019    Sig - Route: Infuse 100 mL into a venous catheter As Needed (Mg 1.1 - 1.5 mg/dL). - Intravenous    Reason for Discontinue: Patient Discharge    Linked Group 1:  \"Or\" Linked Group Details        magnesium sulfate 4 gram infusion - Mg less than or equal to 1mg/dL (Discontinued) 4 g As Needed 11/18/2019 11/19/2019    Sig - Route: Infuse 100 mL into a venous catheter As Needed (Mg less than or equal to 1mg/dL). - Intravenous    Reason for Discontinue: Patient Discharge    Linked Group 1:  \"Or\" Linked Group Details        metoprolol succinate XL (TOPROL-XL) 24 hr tablet 25 mg (Discontinued) 25 mg Every 24 Hours Scheduled 11/19/2019 11/19/2019    Sig - Route: Take 1 tablet by mouth Daily. - Oral    Reason for Discontinue: Patient Discharge    metoprolol tartrate (LOPRESSOR) injection (Discontinued)  As Needed 11/18/2019 11/18/2019    Sig: As Needed.    Reason for Discontinue: Patient Discharge    metoprolol tartrate (LOPRESSOR) tablet 25 mg (Discontinued) 25 mg Every 12 Hours Scheduled 11/18/2019 11/19/2019    Sig - Route: Take 1 tablet by mouth Every 12 (Twelve) Hours. - Oral    midazolam (VERSED) injection (Discontinued)  As Needed 11/18/2019 11/18/2019    Sig: As Needed.    Reason for Discontinue: Patient Discharge    morphine injection 1 mg (Discontinued) 1 mg Every 4 Hours PRN " "11/18/2019 11/19/2019    Sig - Route: Infuse 0.5 mL into a venous catheter Every 4 (Four) Hours As Needed for Severe Pain . - Intravenous    Reason for Discontinue: Patient Discharge    Linked Group 2:  \"And\" Linked Group Details        naloxone (NARCAN) injection 0.4 mg (Discontinued) 0.4 mg Every 5 Minutes PRN 11/18/2019 11/19/2019    Sig - Route: Infuse 1 mL into a venous catheter Every 5 (Five) Minutes As Needed for Respiratory Depression. - Intravenous    Reason for Discontinue: Patient Discharge    Linked Group 2:  \"And\" Linked Group Details        nicotine (NICODERM CQ) 21 MG/24HR patch 1 patch (Discontinued) 1 patch Every 24 Hours Scheduled 11/18/2019 11/19/2019    Sig - Route: Place 1 patch on the skin as directed by provider Daily. - Transdermal    Reason for Discontinue: Patient Discharge    nitroGLYCERIN 200 mcg/mL 30 mL syringe (Discontinued)  As Needed 11/18/2019 11/18/2019    Sig: As Needed.    Reason for Discontinue: Patient Discharge    potassium chloride (MICRO-K) CR capsule 40 mEq (Discontinued) 40 mEq As Needed 11/18/2019 11/19/2019    Sig - Route: Take 4 capsules by mouth As Needed (Potassium Replacement.  See Admin Instructions). - Oral    Reason for Discontinue: Patient Discharge    verapamil (ISOPTIN) 500 mcg, nitroglycerin (TRIDIL) 200 mcg, heparin (porcine) 3,000 Units radial artery injection (Discontinued)  As Needed 11/18/2019 11/18/2019    Sig: As Needed.    Reason for Discontinue: Patient Discharge          Orders (last 72 hrs)     Start     Ordered    11/20/19 0600  Troponin  Morning Draw,   Status:  Canceled      11/19/19 0746    11/20/19 0500  ECG 12 Lead  Tomorrow AM,   Status:  Canceled      11/19/19 0746    11/20/19 0000  aspirin 81 MG chewable tablet  Daily      11/19/19 0835    11/20/19 0000  clopidogrel (PLAVIX) 75 MG tablet  Daily      11/19/19 0835    11/19/19 0900  clopidogrel (PLAVIX) tablet 75 mg  Daily,   Status:  Discontinued      11/18/19 1316    11/19/19 0900  aspirin " chewable tablet 81 mg  Daily,   Status:  Discontinued      11/18/19 1316    11/19/19 0900  metoprolol succinate XL (TOPROL-XL) 24 hr tablet 25 mg  Every 24 Hours Scheduled,   Status:  Discontinued      11/19/19 0746    11/19/19 0834  Discontinue IV  Once,   Status:  Canceled      11/19/19 0835    11/19/19 0833  Discharge patient  Once      11/19/19 0835    11/19/19 0700  ECG 12 Lead  Once      11/18/19 1316    11/19/19 0616  POC Activated Clotting Time  Once      11/18/19 1201    11/19/19 0616  POC Activated Clotting Time  Once      11/18/19 1229    11/19/19 0615  POC Activated Clotting Time  Once      11/18/19 1147    11/19/19 0600  CBC (No Diff)  Morning Draw      11/18/19 1316    11/19/19 0600  Basic Metabolic Panel  Morning Draw,   Status:  Canceled      11/18/19 1316    11/19/19 0600  Hemoglobin A1c  Morning Draw      11/18/19 1316    11/19/19 0600  Lipid Panel  Morning Draw     Comments:  Fasting      11/18/19 1316    11/19/19 0600  Troponin  Morning Draw      11/18/19 1316    11/19/19 0600  Magnesium  Morning Draw     Comments:  Electrolyte replacement protocol      11/18/19 1700    11/19/19 0000  atorvastatin (LIPITOR) 40 MG tablet  Nightly      11/19/19 0835    11/19/19 0000  metoprolol succinate XL (TOPROL-XL) 25 MG 24 hr tablet  Every 24 Hours Scheduled      11/19/19 0835    11/19/19 0000  Discharge Follow-up with Specified Provider: WILL Mistry; 1 Week      11/19/19 0835    11/19/19 0000  Discharge Follow-up with Specified Provider: Dr. Hawkins; 1 Month      11/19/19 0835    11/18/19 2100  atorvastatin (LIPITOR) tablet 40 mg  Nightly,   Status:  Discontinued      11/18/19 1316    11/18/19 1947  POC Glucose Once  Once      11/18/19 1945    11/18/19 1701  Patient Currently On Electrolyte Replacement Protocol - Please Refer to MAR for Protocol Details  Misc Nursing Order (Specify)  Daily,   Status:  Canceled     Comments:  Patient Currently On Electrolyte Replacement Protocol - Please Refer to MAR for  Protocol Details    11/18/19 1700    11/18/19 1701  Basic Metabolic Panel  Daily,   Status:  Canceled     Comments:  Electrolyte replacement protocol      11/18/19 1700    11/18/19 1659  magnesium sulfate 4 gram infusion - Mg less than or equal to 1mg/dL  As Needed,   Status:  Discontinued      11/18/19 1700    11/18/19 1659  magnesium sulfate 3 gram infusion (1gm x 3) - Mg 1.1 - 1.5 mg/dL  As Needed,   Status:  Discontinued      11/18/19 1700 11/18/19 1659  Magnesium Sulfate 2 gram infusion- Mg 1.6 - 1.9 mg/dL  As Needed,   Status:  Discontinued      11/18/19 1700    11/18/19 1659  potassium chloride (MICRO-K) CR capsule 40 mEq  As Needed,   Status:  Discontinued      11/18/19 1700 11/18/19 1659  Magnesium  As Needed,   Status:  Canceled      11/18/19 1700    11/18/19 1659  Potassium  As Needed,   Status:  Canceled      11/18/19 1700    11/18/19 1612  POC Glucose Once  Once      11/18/19 1610    11/18/19 1600  Strict intake and output  Every 4 Hours,   Status:  Canceled      11/18/19 1316    11/18/19 1551  Potassium  STAT      11/18/19 1550    11/18/19 1551  Magnesium  STAT      11/18/19 1550    11/18/19 1551  ECG 12 Lead  STAT      11/18/19 1550    11/18/19 1446  ALPRAZolam (XANAX) tablet 0.25 mg  4 Times Daily PRN,   Status:  Discontinued      11/18/19 1446    11/18/19 1415  sodium chloride 0.9 % infusion  Continuous      11/18/19 1316    11/18/19 1415  metoprolol tartrate (LOPRESSOR) tablet 25 mg  Every 12 Hours Scheduled,   Status:  Discontinued      11/18/19 1316    11/18/19 1415  nicotine (NICODERM CQ) 21 MG/24HR patch 1 patch  Every 24 Hours Scheduled,   Status:  Discontinued      11/18/19 1317    11/18/19 1317  Code Status and Medical Interventions:  Continuous,   Status:  Canceled      11/18/19 1316    11/18/19 1317  Activity - Strict Bed Rest  Until Discontinued,   Status:  Canceled      11/18/19 1316    11/18/19 1317  Obtain STAT EKG during chest pain. Notify MD of any change in rhythm, ST segments  or complaints of chest pain.  Until Discontinued,   Status:  Canceled      11/18/19 1316 11/18/19 1317  Encourage fluids  Until Discontinued,   Status:  Canceled      11/18/19 1316 11/18/19 1317  Remove Radial Pressure Device / Dressing  Once      11/18/19 1316 11/18/19 1317  Vital Signs & Reverse Willie's Test (While Radial Compression Device in Place)  Per Order Details,   Status:  Canceled     Comments:  Every 15 Minutes x4, Every 30 Minutes x4, & Every 1 Hour x2    11/18/19 1316 11/18/19 1317  Keep Affected Arm Straight & Elevated  Continuous,   Status:  Canceled      11/18/19 1316 11/18/19 1317  Activity As Tolerated  Until Discontinued,   Status:  Canceled      11/18/19 1316 11/18/19 1317  Verify Discontinuation of enoxaparin (LOVENOX) and / or heparin  Once      11/18/19 1316 11/18/19 1317  Notify MD if platelet count is less than 100,000, is less than 1/2 baseline, or if Hgb drops by more than 3mg/dl.  Until Discontinued,   Status:  Canceled      11/18/19 1316 11/18/19 1317  Notify MD of hypotension (SBP less than 95), bleeding, or dysrythmia and follow Sheath Removal Policy if needed.  Until Discontinued,   Status:  Canceled      11/18/19 1316 11/18/19 1317  Oxygen Therapy- Nasal Cannula; Titrate for SPO2: equal to or greater than, 92%, per policy  Continuous,   Status:  Canceled      11/18/19 1316 11/18/19 1317  Continuous Pulse Oximetry  Continuous,   Status:  Canceled      11/18/19 1316 11/18/19 1317  Advance diet as tolerated  Until Discontinued,   Status:  Canceled      11/18/19 1316 11/18/19 1317  ECG 12 Lead  STAT      11/18/19 1316 11/18/19 1317  Diet Regular; Consistent Carbohydrate, Cardiac  Diet Effective Now,   Status:  Canceled      11/18/19 1316 11/18/19 1317  Adult Transthoracic Echo Complete W/ Cont if Necessary Per Protocol  Once      11/18/19 1316 11/18/19 1317  Diet Regular; Cardiac  Diet Effective Now,   Status:  Canceled      11/18/19 1317     11/18/19 1316  Vital Signs  As Needed,   Status:  Canceled      11/18/19 1316    11/18/19 1316  morphine injection 1 mg  Every 4 Hours PRN,   Status:  Discontinued      11/18/19 1316    11/18/19 1316  naloxone (NARCAN) injection 0.4 mg  Every 5 Minutes PRN,   Status:  Discontinued      11/18/19 1316    11/18/19 1316  Change site dressing  As Needed,   Status:  Canceled      11/18/19 1316    11/18/19 1316  acetaminophen (TYLENOL) tablet 650 mg  Every 4 Hours PRN,   Status:  Discontinued      11/18/19 1316    11/18/19 1316  HYDROcodone-acetaminophen (NORCO) 5-325 MG per tablet 1 tablet  Every 4 Hours PRN,   Status:  Discontinued      11/18/19 1316    11/18/19 1306  POC Glucose Once  Once      11/18/19 1304    11/18/19 1246  Inpatient Admission  Once      11/18/19 1248    11/18/19 1243  clopidogrel (PLAVIX) tablet  As Needed,   Status:  Discontinued      11/18/19 1243    11/18/19 1241  iopamidol (ISOVUE-370) 76 % injection  As Needed,   Status:  Discontinued      11/18/19 1242    11/18/19 1237  metoprolol tartrate (LOPRESSOR) injection  As Needed,   Status:  Discontinued      11/18/19 1237    11/18/19 1200  nitroGLYCERIN 200 mcg/mL 30 mL syringe  As Needed,   Status:  Discontinued      11/18/19 1200    11/18/19 1154  heparin (porcine) injection  As Needed,   Status:  Discontinued      11/18/19 1154    11/18/19 1141  verapamil (ISOPTIN) 500 mcg, nitroglycerin (TRIDIL) 200 mcg, heparin (porcine) 3,000 Units radial artery injection  As Needed,   Status:  Discontinued      11/18/19 1142    11/18/19 1141  sodium chloride 0.9 % infusion  Continuous PRN      11/18/19 1144    11/18/19 1140  lidocaine (XYLOCAINE) 2% injection  As Needed,   Status:  Discontinued      11/18/19 1140    11/18/19 1138  midazolam (VERSED) injection  As Needed,   Status:  Discontinued      11/18/19 1138    11/18/19 1138  fentaNYL citrate (PF) (SUBLIMAZE) injection  As Needed,   Status:  Discontinued      11/18/19 1138    11/18/19 1055  Cardiac  Catheterization/Vascular Study  Once      19 1054    19 0000  Ambulatory Referral to Cardiac Rehab      19 1248    --  NON FORMULARY      19 1309                  Discharge Summary      Roselyn Hawkins MD at 19 0837            [facility] Hospital Discharge    Patient Name: Isaac Holley  Age/Sex: 44 y.o. male  : 1975  MRN: [id number]    Encounter Provider: Roselyn Hawkins MD  Referring Provider: Roselyn Hawkins MD  Place of Service: [facility] CARDIOLOGY  Patient Care Team:  Provider, No Known as PCP - General  Provider, No Known as PCP - Family Medicine         Date of Discharge:  2019     Date of Admit: 2019    Discharge Condition: Stable  Discharge Diagnosis:    Acute posterior myocardial infarction (CMS/HCC)      Hospital Course:   This is a 44-year-old with a history of tobacco use, chronic back pain, untreated hypertension, who was admitted with a posterior myocardial infarction.    He presented to [facility] with complaints of sudden onset chest pressure.  On arrival he was noted to have inferolateral ST elevations with anterior ST depressions.  Interventional cardiology was not available at facility at the time so the patient was transferred emergently by air methods to [facility].  Upon arrival he was brought directly to cardiac catheterization laboratory.  Cardiac catheterization showed evidence of an occluded distal left circumflex vessel for which he underwent drug-eluting stent placement.  His remaining coronary arteries showed mild nonobstructive coronary artery disease.  Echocardiogram following his myocardial infarction showed normal left ventricular systolic function and wall motion with no significant valvular disease.  He did have episodes of nonsustained ventricular tachycardia following his myocardial infraction the longest lasting about 15 beats which were all  asymptomatic.    Following his procedure the patient insisted on going outside to smoke.  He stated that he was unable to go without a cigarette for very long.  After explaining the situation the patient was able to stay the first night.  The morning following his myocardial infarction the patient reported that he would not be able to stay any longer hospital.  I tried discussing this with the patient and discouraged him from leaving the hospital and explained that I cannot let him walk out of the hospital to smoke a cigarette.  The patient was insistent.  He will be discharged AGAINST MEDICAL ADVICE.  Since it is imperative that the patient be on medications for his recent myocardial infarction and stent placement I will provide prescriptions and follow-up appointment for him if he wishes to use them.    He was advised of the possibility of arrhythmias, recurrent stent thrombosis and myocardial infarction as some of the possible complications that could arise.  Was stressed the importance of taking his medications as directed especially his dual antiplatelet therapy.    Objective:  Temp:  [97.8 °F (36.6 °C)-98.9 °F (37.2 °C)] 98.5 °F (36.9 °C)  Heart Rate:  [58-82] 69  Resp:  [10-22] 15  BP: (121-170)/() 122/77    Intake/Output Summary (Last 24 hours) at 11/19/2019 0837  Last data filed at 11/19/2019 0404  Gross per 24 hour   Intake 1970 ml   Output 2000 ml   Net -30 ml     Body mass index is 26.57 kg/m².      11/18/19  1220 11/18/19  1529 11/19/19  0404   Weight: 82.4 kg (181 lb 10.5 oz) 82 kg (180 lb 12.4 oz) 84.2 kg (185 lb 10 oz)     Weight change:     Physical Exam:   General Appearance:    No acute distress, alert and oriented x4   Lungs:     Clear to auscultation bilaterally     Heart:    Regular rhythm and normal rate.  No murmurs, gallops, or       rubs.   Abdomen:     Soft, non-tender, non-distended.    Extremities:   Moves all extremities well.  No clubbing, cyanosis, or edema.       Procedures  Performed  Procedure(s):  Left Heart Cath  Coronary angiography  Percutaneous Coronary Intervention       Consults:  Consults     No orders found for last 30 day(s).          Pertinent Test Results:  Results from last 7 days   Lab Units 11/19/19  0403 11/18/19  1554   SODIUM mmol/L 140 138   POTASSIUM mmol/L 4.5 3.6  3.5   CHLORIDE mmol/L 105 100   CO2 mmol/L 25.8 24.2   BUN mg/dL 6 5*   CREATININE mg/dL 0.73* 0.74*   GLUCOSE mg/dL 110* 134*   CALCIUM mg/dL 8.2* 8.5*     Results from last 7 days   Lab Units 11/19/19  0403   TROPONIN T ng/mL 4.740*     Results from last 7 days   Lab Units 11/19/19  0403   WBC 10*3/mm3 9.54   HEMOGLOBIN g/dL 12.7*   HEMATOCRIT % 37.5   PLATELETS 10*3/mm3 304         Results from last 7 days   Lab Units 11/19/19  0403 11/18/19  1554   MAGNESIUM mg/dL 2.2 1.8     Results from last 7 days   Lab Units 11/19/19  0403   CHOLESTEROL mg/dL 187   TRIGLYCERIDES mg/dL 169*   HDL CHOL mg/dL 34*               Discharge Medications     Discharge Medications      New Medications      Instructions Start Date   aspirin 81 MG chewable tablet   81 mg, Oral, Daily   Start Date:  11/20/2019     atorvastatin 40 MG tablet  Commonly known as:  LIPITOR   40 mg, Oral, Nightly      clopidogrel 75 MG tablet  Commonly known as:  PLAVIX   75 mg, Oral, Daily   Start Date:  11/20/2019     metoprolol succinate XL 25 MG 24 hr tablet  Commonly known as:  TOPROL-XL   25 mg, Oral, Every 24 Hours Scheduled         Continue These Medications      Instructions Start Date   NON FORMULARY   KRATOM (OTC)              Discharge Diet:    Dietary Orders (From admission, onward)    Start     Ordered    11/18/19 1317  Diet Regular; Cardiac  Diet Effective Now     Question Answer Comment   Diet Texture / Consistency Regular    Common Modifiers Cardiac        11/18/19 1317          Activity at Discharge:   as instructed    Discharge disposition: home     Discharge Instructions and Follow ups:  No future appointments.  Additional  Instructions for the Follow-ups that You Need to Schedule     Ambulatory Referral to Cardiac Rehab   As directed      Discharge Follow-up with Specified Provider: WILL Mistry; 1 Week   As directed      To:  WILL Mistry    Follow Up:  1 Week         Discharge Follow-up with Specified Provider: Dr. Hawkins; 1 Month   As directed      To:  Dr. Hawkins    Follow Up:  1 Month           Follow-up Information     [facility] CARD REHAB .    Specialty:  Cardiac Rehabilitation  Contact information:  [address] [place], [place] [address] [address] [phone]           Provider, No Known .    Contact information:  [facility] [address]                      Roselyn Hawkins MD  11/19/19  8:37 AM    Time: Discharge 20 min       Electronically signed by Roselyn Hawkins MD at 11/19/19 0001

## 2019-11-19 NOTE — PLAN OF CARE
Problem: Patient Care Overview  Goal: Plan of Care Review  Outcome: Ongoing (interventions implemented as appropriate)   11/19/19 0533   Coping/Psychosocial   Plan of Care Reviewed With patient   Plan of Care Review   Progress no change   OTHER   Outcome Summary complaints of squeezing chest pain off and on throughout the night controlled with morphiene, no other complaints, 5 runs of V Tach overnight between 3-4 beats long, room air, tolerating diet, ambulates standby assist, will continue to monitor      Goal: Individualization and Mutuality  Outcome: Ongoing (interventions implemented as appropriate)    Goal: Discharge Needs Assessment  Outcome: Ongoing (interventions implemented as appropriate)    Goal: Interprofessional Rounds/Family Conf  Outcome: Ongoing (interventions implemented as appropriate)      Problem: Pain, Chronic (Adult)  Goal: Acceptable Pain/Comfort Level and Functional Ability  Outcome: Ongoing (interventions implemented as appropriate)      Problem: Cardiac Catheterization (Diagnostic/Interventional) (Adult)  Goal: Signs and Symptoms of Listed Potential Problems Will be Absent, Minimized or Managed (Cardiac Catheterization)  Outcome: Ongoing (interventions implemented as appropriate)      Problem: Cardiac: ACS (Acute Coronary Syndrome) (Adult)  Goal: Signs and Symptoms of Listed Potential Problems Will be Absent, Minimized or Managed (Cardiac: ACS)  Outcome: Ongoing (interventions implemented as appropriate)

## 2019-11-19 NOTE — NURSING NOTE
Patient left AGAINST MEDICAL ADVICE. Paperwork signed.     Risks and benefits explained to exhaustion by myself, CCU charge nurse, and Dr. Hawkins.     Patient A/Ox4 when he left, all medical devices removed, told to get prescriptions from pharmacy, and reminded to call 911 immediately for chest pain or shortness of breath.     Walked to main elevators accompanied by nursing at 0900. Provided number for cab company at patient request.

## 2019-11-20 ENCOUNTER — READMISSION MANAGEMENT (OUTPATIENT)
Dept: CALL CENTER | Facility: HOSPITAL | Age: 44
End: 2019-11-20

## 2019-11-20 NOTE — OUTREACH NOTE
Prep Survey      Responses   Facility patient discharged from?  [place]   Is patient eligible?  No   What are the reasons patient is not eligible?  Other [LEFT AMA (because he couldn't go outside to smoke)]   Does the patient have one of the following disease processes/diagnoses(primary or secondary)?  Acute MI (STEMI,NSTEMI)   Prep survey completed?  Yes          Carmen Mai RN        
none

## 2019-11-26 ENCOUNTER — OFFICE VISIT (OUTPATIENT)
Dept: CARDIOLOGY | Facility: CLINIC | Age: 44
End: 2019-11-26

## 2019-11-26 VITALS
WEIGHT: 191.2 LBS | SYSTOLIC BLOOD PRESSURE: 128 MMHG | HEART RATE: 80 BPM | BODY MASS INDEX: 27.37 KG/M2 | DIASTOLIC BLOOD PRESSURE: 90 MMHG | HEIGHT: 70 IN

## 2019-11-26 DIAGNOSIS — I25.10 CORONARY ARTERY DISEASE INVOLVING NATIVE CORONARY ARTERY OF NATIVE HEART, ANGINA PRESENCE UNSPECIFIED: ICD-10-CM

## 2019-11-26 DIAGNOSIS — Z95.5 S/P DRUG ELUTING CORONARY STENT PLACEMENT: ICD-10-CM

## 2019-11-26 DIAGNOSIS — I21.29 ACUTE POSTERIOR MYOCARDIAL INFARCTION (HCC): Primary | ICD-10-CM

## 2019-11-26 DIAGNOSIS — E78.5 HYPERLIPIDEMIA LDL GOAL <70: ICD-10-CM

## 2019-11-26 DIAGNOSIS — F17.210 CIGARETTE NICOTINE DEPENDENCE WITHOUT COMPLICATION: ICD-10-CM

## 2019-11-26 DIAGNOSIS — I10 ESSENTIAL HYPERTENSION: ICD-10-CM

## 2019-11-26 DIAGNOSIS — R09.89 LEFT CAROTID BRUIT: ICD-10-CM

## 2019-11-26 DIAGNOSIS — Z72.0 TOBACCO ABUSE: ICD-10-CM

## 2019-11-26 PROCEDURE — 99214 OFFICE O/P EST MOD 30 MIN: CPT | Performed by: NURSE PRACTITIONER

## 2019-11-26 PROCEDURE — 93000 ELECTROCARDIOGRAM COMPLETE: CPT | Performed by: NURSE PRACTITIONER

## 2019-11-26 PROCEDURE — 99406 BEHAV CHNG SMOKING 3-10 MIN: CPT | Performed by: NURSE PRACTITIONER

## 2019-11-26 NOTE — PROGRESS NOTES
Date of Office Visit: 19  Encounter Provider: WILL Gee  Primary Cardiologist: Dr. Hawkins  Place of Service: [facility] CARDIOLOGY  Patient Name: Isaac Holley  :1975      Subjective:     Chief Complaint:  Hospital follow up post MI REGINALDO to circumflex    History of Present Illness:  Isaac Holley is a pleasant 44 y.o. male who is new to me .  Outside records have been obtained and reviewed by me.     This is a patient of Dr. Hawkins with history of tobacco abuse, untreated hypertension, chronic back pain who presented to [facility] on 2019 with a posterior MI.    2019 he had sudden onset left-sided chest pain starting all he was at work.  He had a similar pain several years ago but nothing recent.  He went to [facility] and EKG showed inferolateral ST elevation and anterior ST depression.  He was air flighted to [place] and underwent cardiac catheterization which showed 100% occlusion of the distal left circumflex for which she underwent drug-eluting stent placement.  The remainder of coronaries showed mild nonobstructive coronary artery disease. Post MI his EF was normal at 63% with no significant valvular disease and no wall motion abnormalities.His hemoglobin A1c was normal at 5.33%, lipid panel showed elevated triglycerides of 169, low HDL 34, LDL was elevated at 119.He had some nonsustained ventricular tachycardia following his MI the longest lasted about 15 beats.   He left AGAINST MEDICAL ADVICE because he wanted to go outside and smoke on 19 with aspirin, Plavix, atorvastatin, Metoprolol succinate 25 mg daily.    He is presenting today for hospital follow-up visit.  Fortunately he is without any recurrent symptoms today.  When he left the hospital he was confused about his medicine and thought may be of supposed to hold it for 3 days but he was unsure so he just took half of each of his medicines for 3 days.   He has not had any recurrent chest pain.  He denies shortness of breath, palpitations,, lower extremity edema, dizziness, lightheadedness, syncope, near syncope.  He is no measuring his blood pressure at home but reports he can get a blood pressure cuff and he was encouraged to do so.  Unfortunately still smoking about a pack per day sometimes a little less.  He is interested in trying to quit but has been unsuccessful with nicotine patches and lozenges.  He is interested in seeing the NP in the smoking cessation clinic.  He denies any bleeding issues on dual antiplatelet therapy or other side effects with medications.  He reports he has not seen a primary care physician in quite a while.  He was given some recommendations and encouraged to establish care with someone near where he lives.          Past Medical History:   Diagnosis Date   • Acute posterior myocardial infarction (CMS/HCC)    • CAD (coronary artery disease)    • Chronic pain    • Hypertension    • Smoker    • TIA (transient ischemic attack)     10 yrs ago; left side of face went droppy, self-resolved     Past Surgical History:   Procedure Laterality Date   • BACK SURGERY      rods in back - [facility] by truck   • CARDIAC CATHETERIZATION N/A 11/18/2019    Procedure: Left Heart Cath;  Surgeon: Roselyn Hawkins MD;  Location: [facility] CATH INVASIVE LOCATION;  Service: Cardiovascular   • CARDIAC CATHETERIZATION N/A 11/18/2019    Procedure: Coronary angiography;  Surgeon: Roselyn Hawkins MD;  Location: [facility] CATH INVASIVE LOCATION;  Service: Cardiovascular   • CARDIAC CATHETERIZATION N/A 11/18/2019    Procedure: Stent REGINALDO coronary;  Surgeon: Roselyn Hawkins MD;  Location: [facility] CATH INVASIVE LOCATION;  Service: Cardiovascular   • MOUTH SURGERY     • MI RT/LT HEART CATHETERS N/A 11/18/2019    Procedure: Percutaneous Coronary Intervention;  Surgeon: Roselyn Hawkins MD;  Location: [facility] CATH INVASIVE LOCATION;  Service: Cardiovascular   • WRIST SURGERY       right wrist - cut by glass     Outpatient Medications Prior to Visit   Medication Sig Dispense Refill   • aspirin 81 MG chewable tablet Chew 1 tablet Daily.     • atorvastatin (LIPITOR) 40 MG tablet Take 1 tablet by mouth Every Night. 30 tablet 1   • clopidogrel (PLAVIX) 75 MG tablet Take 1 tablet by mouth Daily. 30 tablet 11   • metoprolol succinate XL (TOPROL-XL) 25 MG 24 hr tablet Take 1 tablet by mouth Daily. 30 tablet 1   • NON FORMULARY KRATOM (OTC)       No facility-administered medications prior to visit.        Allergies as of 11/26/2019   • (No Known Allergies)     Social History     Socioeconomic History   • Marital status:      Spouse name: Not on file   • Number of children: Not on file   • Years of education: Not on file   • Highest education level: Not on file   Tobacco Use   • Smoking status: Current Every Day Smoker     Packs/day: 1.50     Years: 30.00     Pack years: 45.00     Types: Cigarettes   • Tobacco comment: caffeine use    Substance and Sexual Activity   • Alcohol use: No     Frequency: Never     Drinks per session: 1 or 2     Binge frequency: Never   • Drug use: No     History reviewed. No pertinent family history.  Review of Systems   Constitution: Negative for chills, fever and malaise/fatigue.   HENT: Negative for ear pain, hearing loss, nosebleeds and sore throat.    Eyes: Negative for double vision, pain, vision loss in left eye and vision loss in right eye.   Cardiovascular: Negative for chest pain, claudication, dyspnea on exertion, irregular heartbeat, leg swelling, near-syncope, orthopnea, palpitations, paroxysmal nocturnal dyspnea and syncope.   Respiratory: Negative for cough, shortness of breath, snoring and wheezing.    Endocrine: Negative for cold intolerance and heat intolerance.   Hematologic/Lymphatic: Negative for bleeding problem.   Skin: Negative for color change, itching, rash and unusual hair distribution.   Musculoskeletal: Negative for joint pain and  "joint swelling.   Gastrointestinal: Negative for abdominal pain, diarrhea, hematochezia, melena, nausea and vomiting.   Genitourinary: Negative for decreased libido, frequency, hematuria, hesitancy and incomplete emptying.   Neurological: Negative for excessive daytime sleepiness, dizziness, headaches, light-headedness, loss of balance, numbness, paresthesias and seizures.   Psychiatric/Behavioral: Negative for depression.          Objective:     Vitals:    11/26/19 1054   BP: 128/90   BP Location: Left arm   Patient Position: Sitting   Cuff Size: Adult   Pulse: 80   Weight: 86.7 kg (191 lb 3.2 oz)   Height: 177.8 cm (70\")     Body mass index is 27.43 kg/m².    PHYSICAL EXAM:  Physical Exam   Constitutional: He is oriented to person, place, and time. He appears well-developed and well-nourished. No distress.   HENT:   Head: Normocephalic and atraumatic.   Eyes: Conjunctivae and EOM are normal. Pupils are equal, round, and reactive to light.   Neck: No JVD present. Carotid bruit is present (left sided).   Cardiovascular: Normal rate, regular rhythm, normal heart sounds and intact distal pulses.   No murmur heard.  Pulses:       Carotid pulses are on the left side with bruit.       Radial pulses are 2+ on the right side, and 2+ on the left side.        Posterior tibial pulses are 2+ on the right side, and 2+ on the left side.   Right radial cath site well healed without erythema or ecchymosis, palpable proximal and distal pulses, good capillary refill, good motor function of hand, no thrill or bruit detected, site is soft and non-tender with no hematoma, no sensory deficits noted.    No lower extremity edema     Pulmonary/Chest: Effort normal and breath sounds normal. No accessory muscle usage. No tachypnea. No respiratory distress. He has no decreased breath sounds. He has no wheezes. He has no rhonchi. He has no rales. He exhibits no tenderness.   Abdominal: Soft. Bowel sounds are normal. He exhibits no distension. " There is no tenderness. There is no rebound and no guarding.   Musculoskeletal: Normal range of motion. He exhibits no edema.   Neurological: He is alert and oriented to person, place, and time.   Skin: Skin is warm, dry and intact. He is not diaphoretic. No erythema.   Tattoos   Psychiatric: He has a normal mood and affect. His speech is normal and behavior is normal. Judgment and thought content normal. Cognition and memory are normal.   Nursing note and vitals reviewed.        ECG 12 Lead  Date/Time: 11/26/2019 11:00 AM  Performed by: Venita Negro APRN  Authorized by: Venita Negro APRN   Comparison: compared with previous ECG from 11/19/2019  Similar to previous ECG  Comparison to previous ECG: Reviewed current and prior ECG with Dr. Hawkins  Rhythm: sinus rhythm  Rate: normal  BPM: 80  QRS axis: left  Comments: ST elevation consistent with recent posterior infarct, slight ST depression in anterior lateral leads, T wave flattening AVL  Indication: Recent posterior infarct and drug-eluting stent placement to circumflex            Assessment:       Diagnosis Plan   1. Acute posterior myocardial infarction (CMS/HCC)  Duplex Carotid Ultrasound CAR   2. Coronary artery disease involving native coronary artery of native heart, angina presence unspecified  Duplex Carotid Ultrasound CAR   3. S/P drug eluting coronary stent placement  Duplex Carotid Ultrasound CAR   4. Cigarette nicotine dependence without complication  Duplex Carotid Ultrasound CAR   5. Hyperlipidemia LDL goal <70  Duplex Carotid Ultrasound CAR   6. Essential hypertension  Duplex Carotid Ultrasound CAR   7. Tobacco abuse  Duplex Carotid Ultrasound CAR   8. Left carotid bruit  Duplex Carotid Ultrasound CAR       Plan:        1. Posterior myocardial infarction:  Status post drug eluting stent placement of the distal left circumflex artery 11/18/19.  Preserved LV function with an EF of 63%.  Now on dual antiplatelet therapy, statin and metoprolol  succinate.  2. Coronary artery disease:  Mild non-obstructive disease of the remaining coronary arteries.   3. Hypertension: Had been untreated.  Blood pressure borderline elevated with diastolic reading today.  I have encouraged him to monitor at home now that he is on metoprolol succinate 25 mg daily.  4. Tobacco use: I spent 3 minutes counseling the patient on smoking cessation.  I reviewed with him the risk of increasing cardiovascular disease, lung disease and cancers.  He has been unsuccessful with nicotine patches and lozenges in the past.  He is interested in going to looking cessation clinic.  I will place a referral.  5.  Hyperlipidemia: Now on statin therapy.  Recommend repeat labs 8 to 12 weeks with a primary care physician he establishes care.  He demonstrated understanding.  6.  Left carotid bruit: Loud.  Will check carotid artery Dopplers given his known CAD history and smoking history.  He denies any recent strokelike symptoms.  Further recommendations made following his testing.    I advised on the importance of good blood pressure, blood sugar and cholesterol control, as well as regular exercise and weight loss and avoidance of tobacco for cardiovascular disease risk factor modification.  He was also educated on the importance of medication compliant and not adjusting his dosage unless he has instruction from a provider.  He demonstrated understanding.  He is okay to go back to work next week.  We will get him enrolled in cardiac rehab at [facility].      Follow up with Dr. Hawkins on 12/19/19, unless otherwise needed sooner.  I advised the patient to contact our office with any questions or concerns.      I have reviewed this case with Dr. Hawkins. It has been a pleasure to participate in this patient's care. Please feel free to contact me with any questions or concerns.     Venita Negro, APRN  11/26/19             Your medication list           Accurate as of 11/26/19 11:25 AM. If you have any  questions, ask your nurse or doctor.               CONTINUE taking these medications      Instructions Last Dose Given Next Dose Due   aspirin 81 MG chewable tablet      Chew 1 tablet Daily.       atorvastatin 40 MG tablet  Commonly known as:  LIPITOR      Take 1 tablet by mouth Every Night.       clopidogrel 75 MG tablet  Commonly known as:  PLAVIX      Take 1 tablet by mouth Daily.       metoprolol succinate XL 25 MG 24 hr tablet  Commonly known as:  TOPROL-XL      Take 1 tablet by mouth Daily.       NON FORMULARY      KRATOM (OTC)              The above medication changes may not have been made by this provider.  Medication list was updated to reflect medications patient is currently taking including medication changes and discontinuations made by other healthcare providers.     Dictated utilizing Dragon Dictation System.

## 2019-11-27 ENCOUNTER — TELEPHONE (OUTPATIENT)
Dept: CARDIOLOGY | Facility: CLINIC | Age: 44
End: 2019-11-27

## 2019-11-27 NOTE — TELEPHONE ENCOUNTER
----- Message from WILL Gee sent at 11/26/2019  3:08 PM EST -----  Regarding: Cardiac rehab referral to [facility]   Please and thank you

## 2019-12-23 ENCOUNTER — TELEPHONE (OUTPATIENT)
Dept: CARDIOLOGY | Facility: CLINIC | Age: 44
End: 2019-12-23

## 2020-01-30 ENCOUNTER — OFFICE VISIT (OUTPATIENT)
Dept: CARDIOLOGY | Facility: CLINIC | Age: 45
End: 2020-01-30

## 2020-01-30 VITALS
DIASTOLIC BLOOD PRESSURE: 88 MMHG | HEART RATE: 76 BPM | BODY MASS INDEX: 27.77 KG/M2 | SYSTOLIC BLOOD PRESSURE: 122 MMHG | HEIGHT: 70 IN | WEIGHT: 194 LBS

## 2020-01-30 DIAGNOSIS — Z95.5 S/P DRUG ELUTING CORONARY STENT PLACEMENT: ICD-10-CM

## 2020-01-30 DIAGNOSIS — Z72.0 TOBACCO ABUSE: ICD-10-CM

## 2020-01-30 DIAGNOSIS — E78.5 HYPERLIPIDEMIA LDL GOAL <70: ICD-10-CM

## 2020-01-30 DIAGNOSIS — F17.210 CIGARETTE NICOTINE DEPENDENCE WITHOUT COMPLICATION: ICD-10-CM

## 2020-01-30 DIAGNOSIS — I25.10 CORONARY ARTERY DISEASE INVOLVING NATIVE CORONARY ARTERY OF NATIVE HEART, ANGINA PRESENCE UNSPECIFIED: Primary | ICD-10-CM

## 2020-01-30 DIAGNOSIS — I10 ESSENTIAL HYPERTENSION: ICD-10-CM

## 2020-01-30 DIAGNOSIS — R09.89 LEFT CAROTID BRUIT: ICD-10-CM

## 2020-01-30 PROCEDURE — 99214 OFFICE O/P EST MOD 30 MIN: CPT | Performed by: INTERNAL MEDICINE

## 2020-01-30 PROCEDURE — 93000 ELECTROCARDIOGRAM COMPLETE: CPT | Performed by: INTERNAL MEDICINE

## 2020-01-30 RX ORDER — ATORVASTATIN CALCIUM 40 MG/1
40 TABLET, FILM COATED ORAL NIGHTLY
Qty: 30 TABLET | Refills: 5 | Status: SHIPPED | OUTPATIENT
Start: 2020-01-30 | End: 2020-06-29

## 2020-01-30 RX ORDER — METOPROLOL SUCCINATE 25 MG/1
25 TABLET, EXTENDED RELEASE ORAL
Qty: 30 TABLET | Refills: 5 | Status: SHIPPED | OUTPATIENT
Start: 2020-01-30 | End: 2020-06-29

## 2020-01-30 NOTE — PROGRESS NOTES
Subjective:     Encounter Date:01/30/2020      Patient ID: Isaac Holley is a 44 y.o. male.    Chief Complaint:  History of Present Illness    This is a 44-year-old with a history of coronary artery disease status post posterior myocardial infarction and drug-eluting stent placement of his distal left circumflex artery, hyperlipidemia, hypertension, tobacco use, who presents for follow-up.    I saw the patient initially when he presented with a posterior myocardial infarction.  He presented initially to [facility] with complaints of sudden onset of chest pressure.  On arrival an EKG was performed showing inferolateral ST elevations with anterior ST depressions.  Interventional cardiology was not available at the facility at the time so he was transferred by helicopter to [facility].  He was brought directly to cardiac catheterization laboratory where coronary angiograms revealed an occluded distal left circumflex artery for which he underwent drug-eluting stent placement.  His remaining coronary arteries showed mild nonobstructive disease.  An echocardiogram following his myocardial infarction showed normal left ventricular systolic function wall motion with no significant valvular disease.  He did have episodes of nonsustained ventricular tachycardia the day following his MI the longest lasting about 15 beats and were asymptomatic.  Patient ended up leaving AGAINST MEDICAL ADVICE since we would not allow him to go outside to smoke a cigarette.  However he was provided all his prescriptions and appointments for follow-up at discharge.    He did return for follow-up with WILL Mistry on 11/26/2019.  At that time he was doing well.  He was noted to have a left carotid bruit and a carotid ultrasound was ordered.  He was also referred for cardiac rehab.    He ended up not participating in cardiac rehab.  He has gone back to work which does involve some exertion and denies any  significant issues with this.  He has some mild fatigue and dyspnea with activity.  Denies any chest pain, palpitations, orthopnea, near-syncope or syncope, or lower extremity edema.  He has not scheduled the carotid ultrasound.  He continues to smoke a half a pack a day and reports that it is difficult for him to quit any further.  He tried nicotine patches in the hospital which were obviously ineffective.    Review of Systems   Constitution: Positive for malaise/fatigue.   HENT: Negative for hearing loss, hoarse voice, nosebleeds and sore throat.    Eyes: Negative for pain.   Cardiovascular: Positive for dyspnea on exertion. Negative for chest pain, claudication, cyanosis, irregular heartbeat, leg swelling, near-syncope, orthopnea, palpitations, paroxysmal nocturnal dyspnea and syncope.   Respiratory: Negative for shortness of breath and snoring.    Endocrine: Negative for cold intolerance, heat intolerance, polydipsia, polyphagia and polyuria.   Skin: Negative for itching and rash.   Musculoskeletal: Negative for arthritis, falls, joint pain, joint swelling, muscle cramps, muscle weakness and myalgias.   Gastrointestinal: Negative for constipation, diarrhea, dysphagia, heartburn, hematemesis, hematochezia, melena, nausea and vomiting.   Genitourinary: Negative for frequency, hematuria and hesitancy.   Neurological: Negative for excessive daytime sleepiness, dizziness, headaches, light-headedness, numbness and weakness.   Psychiatric/Behavioral: Negative for depression. The patient is not nervous/anxious.          Current Outpatient Medications:   •  aspirin 81 MG chewable tablet, Chew 1 tablet Daily., Disp: , Rfl:   •  atorvastatin (LIPITOR) 40 MG tablet, Take 1 tablet by mouth Every Night., Disp: 30 tablet, Rfl: 1  •  clopidogrel (PLAVIX) 75 MG tablet, Take 1 tablet by mouth Daily., Disp: 30 tablet, Rfl: 11  •  metoprolol succinate XL (TOPROL-XL) 25 MG 24 hr tablet, Take 1 tablet by mouth Daily., Disp: 30  tablet, Rfl: 1  •  NON FORMULARY, KRATOM (OTC), Disp: , Rfl:     Past Medical History:   Diagnosis Date   • Acute posterior myocardial infarction (CMS/HCC)    • CAD (coronary artery disease)    • Chronic pain    • Hypertension    • Smoker    • TIA (transient ischemic attack)     10 yrs ago; left side of face went droppy, self-resolved       Past Surgical History:   Procedure Laterality Date   • BACK SURGERY      rods in back - chrushed by truck   • CARDIAC CATHETERIZATION N/A 11/18/2019    Procedure: Left Heart Cath;  Surgeon: Roselyn Hawkins MD;  Location:  [facility];  Service: Cardiovascular   • CARDIAC CATHETERIZATION N/A 11/18/2019    Procedure: Coronary angiography;  Surgeon: Roselyn Hawkins MD;  Location:  [facility];  Service: Cardiovascular   • CARDIAC CATHETERIZATION N/A 11/18/2019    Procedure: Stent REGINALDO coronary;  Surgeon: Roselyn Hawkins MD;  Location:  [facility];  Service: Cardiovascular   • MOUTH SURGERY     • MN RT/LT HEART CATHETERS N/A 11/18/2019    Procedure: Percutaneous Coronary Intervention;  Surgeon: Roselyn Hawkins MD;  Location:  [facility];  Service: Cardiovascular   • WRIST SURGERY      right wrist - cut by glass       No family history on file.    Social History     Tobacco Use   • Smoking status: Current Every Day Smoker     Packs/day: 1.50     Years: 30.00     Pack years: 45.00     Types: Cigarettes   • Tobacco comment: caffeine use    Substance Use Topics   • Alcohol use: No     Frequency: Never     Drinks per session: 1 or 2     Binge frequency: Never   • Drug use: No         ECG 12 Lead  Date/Time: 1/30/2020 2:43 PM  Performed by: Roselyn Hawkins MD  Authorized by: Roselyn Hawkins MD   Comparison: compared with previous ECG   Rhythm: sinus rhythm  Q waves: III and aVF    Comments: Inferior ST changes have improved               Objective:     Visit Vitals  /88 (BP Location: Left arm, Patient  "Position: Sitting)   Pulse 76   Ht 177.8 cm (70\")   Wt 88 kg (194 lb)   BMI 27.84 kg/m²         Physical Exam   Constitutional: He is oriented to person, place, and time. He appears well-developed and well-nourished.   HENT:   Head: Normocephalic and atraumatic.   Neck: No JVD present. Carotid bruit is not present.   Cardiovascular: Normal rate, regular rhythm, S1 normal and S2 normal. Exam reveals no gallop.   No murmur heard.  Pulses:       Carotid pulses are on the left side with bruit.       Radial pulses are 2+ on the right side, and 2+ on the left side.   No bilateral lower extremity edema   Pulmonary/Chest: Effort normal and breath sounds normal.   Abdominal: Soft. Normal appearance.   Neurological: He is alert and oriented to person, place, and time.   Skin: Skin is warm, dry and intact.   Psychiatric: He has a normal mood and affect.       Lab Review:       Assessment:          Diagnosis Plan   1. Coronary artery disease involving native coronary artery of native heart, angina presence unspecified     2. S/P drug eluting coronary stent placement     3. Tobacco abuse     4. Essential hypertension     5. Hyperlipidemia LDL goal <70     6. Left carotid bruit  Duplex Carotid Ultrasound CAR          Plan:       1.  Coronary artery disease.  Appears to recovered well from the standpoint.  Continue current medical management including aspirin, clopidogrel, statin, and beta blockers.  Refills for these medications were sent in.  2.  Hypertension.  Well-controlled on his current dose of metoprolol.  3.  Hyperlipidemia.  On atorvastatin.  4.  Left carotid bruit.  Explained to him the importance of getting carotid ultrasound.  Will place another order to have this scheduled.  5.  Tobacco use.  Discussed alternative options to help with quitting.  We discussed trying Chantix and I did explain the possible adverse effects including vivid dreams.  The patient is willing to try it.  Prescription was sent in.  Spent a " total of 5 minutes counseling the patient on tobacco cessation.    We will call and discuss results of the carotid ultrasound once it is available.  We will plan on seeing the patient back again in 3 months.

## 2020-02-04 ENCOUNTER — APPOINTMENT (OUTPATIENT)
Dept: CARDIOLOGY | Facility: HOSPITAL | Age: 45
End: 2020-02-04

## 2020-06-29 RX ORDER — METOPROLOL SUCCINATE 25 MG/1
25 TABLET, EXTENDED RELEASE ORAL
Qty: 90 TABLET | Refills: 0 | Status: SHIPPED | OUTPATIENT
Start: 2020-06-29

## 2020-06-29 RX ORDER — ATORVASTATIN CALCIUM 40 MG/1
TABLET, FILM COATED ORAL
Qty: 90 TABLET | Refills: 0 | Status: SHIPPED | OUTPATIENT
Start: 2020-06-29

## 2021-10-26 ENCOUNTER — HOSPITAL ENCOUNTER (EMERGENCY)
Facility: HOSPITAL | Age: 46
Discharge: HOME | End: 2021-10-27
Attending: EMERGENCY MEDICINE | Admitting: EMERGENCY MEDICINE

## 2021-10-26 ENCOUNTER — APPOINTMENT (OUTPATIENT)
Dept: GENERAL RADIOLOGY | Facility: HOSPITAL | Age: 46
End: 2021-10-26

## 2021-10-26 DIAGNOSIS — R07.9 CHEST PAIN, UNSPECIFIED TYPE: Primary | ICD-10-CM

## 2021-10-26 LAB
ALBUMIN SERPL-MCNC: 4.3 G/DL (ref 3.5–5.2)
ALBUMIN/GLOB SERPL: 1.4 G/DL
ALP SERPL-CCNC: 111 U/L (ref 39–117)
ALT SERPL W P-5'-P-CCNC: 15 U/L (ref 1–41)
AMPHET+METHAMPHET UR QL: NEGATIVE
ANION GAP SERPL CALCULATED.3IONS-SCNC: 10.1 MMOL/L (ref 5–15)
APAP SERPL-MCNC: <5 MCG/ML (ref 0–30)
AST SERPL-CCNC: 17 U/L (ref 1–40)
BARBITURATES UR QL SCN: NEGATIVE
BASOPHILS # BLD AUTO: 0.02 10*3/MM3 (ref 0–0.2)
BASOPHILS NFR BLD AUTO: 0.2 % (ref 0–1.5)
BENZODIAZ UR QL SCN: NEGATIVE
BILIRUB SERPL-MCNC: 0.2 MG/DL (ref 0–1.2)
BUN SERPL-MCNC: 8 MG/DL (ref 6–20)
BUN/CREAT SERPL: 11.1 (ref 7–25)
CALCIUM SPEC-SCNC: 9.1 MG/DL (ref 8.6–10.5)
CANNABINOIDS SERPL QL: NEGATIVE
CHLORIDE SERPL-SCNC: 103 MMOL/L (ref 98–107)
CK MB SERPL-CCNC: 2.54 NG/ML
CK SERPL-CCNC: 187 U/L (ref 20–200)
CO2 SERPL-SCNC: 27.9 MMOL/L (ref 22–29)
COCAINE UR QL: NEGATIVE
CREAT SERPL-MCNC: 0.72 MG/DL (ref 0.76–1.27)
DEPRECATED RDW RBC AUTO: 43 FL (ref 37–54)
EOSINOPHIL # BLD AUTO: 0.18 10*3/MM3 (ref 0–0.4)
EOSINOPHIL NFR BLD AUTO: 2.1 % (ref 0.3–6.2)
ERYTHROCYTE [DISTWIDTH] IN BLOOD BY AUTOMATED COUNT: 12.6 % (ref 12.3–15.4)
ETHANOL BLD-MCNC: <10 MG/DL (ref 0–10)
ETHANOL UR QL: <0.01 %
GFR SERPL CREATININE-BSD FRML MDRD: 119 ML/MIN/1.73
GFR SERPL CREATININE-BSD FRML MDRD: 144 ML/MIN/1.73
GLOBULIN UR ELPH-MCNC: 3.1 GM/DL
GLUCOSE SERPL-MCNC: 108 MG/DL (ref 65–99)
HCT VFR BLD AUTO: 43.9 % (ref 37.5–51)
HGB BLD-MCNC: 14.5 G/DL (ref 13–17.7)
HOLD SPECIMEN: NORMAL
HOLD SPECIMEN: NORMAL
IMM GRANULOCYTES # BLD AUTO: 0.02 10*3/MM3 (ref 0–0.05)
IMM GRANULOCYTES NFR BLD AUTO: 0.2 % (ref 0–0.5)
LIPASE SERPL-CCNC: 27 U/L (ref 13–60)
LYMPHOCYTES # BLD AUTO: 2.31 10*3/MM3 (ref 0.7–3.1)
LYMPHOCYTES NFR BLD AUTO: 27.2 % (ref 19.6–45.3)
MAGNESIUM SERPL-MCNC: 1.9 MG/DL (ref 1.6–2.6)
MCH RBC QN AUTO: 31 PG (ref 26.6–33)
MCHC RBC AUTO-ENTMCNC: 33 G/DL (ref 31.5–35.7)
MCV RBC AUTO: 93.8 FL (ref 79–97)
METHADONE UR QL SCN: NEGATIVE
MONOCYTES # BLD AUTO: 0.74 10*3/MM3 (ref 0.1–0.9)
MONOCYTES NFR BLD AUTO: 8.7 % (ref 5–12)
NEUTROPHILS NFR BLD AUTO: 5.21 10*3/MM3 (ref 1.7–7)
NEUTROPHILS NFR BLD AUTO: 61.6 % (ref 42.7–76)
NRBC BLD AUTO-RTO: 0 /100 WBC (ref 0–0.2)
NT-PROBNP SERPL-MCNC: 92.4 PG/ML (ref 0–450)
OPIATES UR QL: NEGATIVE
OXYCODONE UR QL SCN: NEGATIVE
PLATELET # BLD AUTO: 347 10*3/MM3 (ref 140–450)
PMV BLD AUTO: 9.7 FL (ref 6–12)
POTASSIUM SERPL-SCNC: 4.1 MMOL/L (ref 3.5–5.2)
PROT SERPL-MCNC: 7.4 G/DL (ref 6–8.5)
RBC # BLD AUTO: 4.68 10*6/MM3 (ref 4.14–5.8)
SALICYLATES SERPL-MCNC: <0.3 MG/DL
SODIUM SERPL-SCNC: 141 MMOL/L (ref 136–145)
TROPONIN I SERPL-MCNC: 0.03 NG/ML (ref 0–0.6)
TROPONIN I SERPL-MCNC: 0.06 NG/ML (ref 0–0.6)
WBC # BLD AUTO: 8.48 10*3/MM3 (ref 3.4–10.8)
WHOLE BLOOD HOLD SPECIMEN: NORMAL
WHOLE BLOOD HOLD SPECIMEN: NORMAL

## 2021-10-26 PROCEDURE — 80307 DRUG TEST PRSMV CHEM ANLYZR: CPT | Performed by: EMERGENCY MEDICINE

## 2021-10-26 PROCEDURE — 84484 ASSAY OF TROPONIN QUANT: CPT

## 2021-10-26 PROCEDURE — 82077 ASSAY SPEC XCP UR&BREATH IA: CPT | Performed by: EMERGENCY MEDICINE

## 2021-10-26 PROCEDURE — 82553 CREATINE MB FRACTION: CPT | Performed by: EMERGENCY MEDICINE

## 2021-10-26 PROCEDURE — 83690 ASSAY OF LIPASE: CPT | Performed by: EMERGENCY MEDICINE

## 2021-10-26 PROCEDURE — 99283 EMERGENCY DEPT VISIT LOW MDM: CPT

## 2021-10-26 PROCEDURE — 80053 COMPREHEN METABOLIC PANEL: CPT | Performed by: EMERGENCY MEDICINE

## 2021-10-26 PROCEDURE — 93005 ELECTROCARDIOGRAM TRACING: CPT | Performed by: EMERGENCY MEDICINE

## 2021-10-26 PROCEDURE — 93005 ELECTROCARDIOGRAM TRACING: CPT

## 2021-10-26 PROCEDURE — 83880 ASSAY OF NATRIURETIC PEPTIDE: CPT | Performed by: EMERGENCY MEDICINE

## 2021-10-26 PROCEDURE — 80179 DRUG ASSAY SALICYLATE: CPT | Performed by: EMERGENCY MEDICINE

## 2021-10-26 PROCEDURE — 80143 DRUG ASSAY ACETAMINOPHEN: CPT | Performed by: EMERGENCY MEDICINE

## 2021-10-26 PROCEDURE — 36415 COLL VENOUS BLD VENIPUNCTURE: CPT | Performed by: EMERGENCY MEDICINE

## 2021-10-26 PROCEDURE — 71045 X-RAY EXAM CHEST 1 VIEW: CPT

## 2021-10-26 PROCEDURE — 82550 ASSAY OF CK (CPK): CPT | Performed by: EMERGENCY MEDICINE

## 2021-10-26 PROCEDURE — 85025 COMPLETE CBC W/AUTO DIFF WBC: CPT | Performed by: EMERGENCY MEDICINE

## 2021-10-26 PROCEDURE — 84484 ASSAY OF TROPONIN QUANT: CPT | Performed by: EMERGENCY MEDICINE

## 2021-10-26 PROCEDURE — 99282 EMERGENCY DEPT VISIT SF MDM: CPT

## 2021-10-26 PROCEDURE — 83735 ASSAY OF MAGNESIUM: CPT | Performed by: EMERGENCY MEDICINE

## 2021-10-26 RX ORDER — ASPIRIN 81 MG/1
324 TABLET, CHEWABLE ORAL ONCE
Status: DISCONTINUED | OUTPATIENT
Start: 2021-10-26 | End: 2021-10-27 | Stop reason: HOSPADM

## 2021-10-26 RX ORDER — SODIUM CHLORIDE 0.9 % (FLUSH) 0.9 %
10 SYRINGE (ML) INJECTION AS NEEDED
Status: DISCONTINUED | OUTPATIENT
Start: 2021-10-26 | End: 2021-10-27 | Stop reason: HOSPADM

## 2021-10-27 VITALS
HEIGHT: 70 IN | TEMPERATURE: 98 F | SYSTOLIC BLOOD PRESSURE: 140 MMHG | OXYGEN SATURATION: 94 % | DIASTOLIC BLOOD PRESSURE: 100 MMHG | WEIGHT: 180 LBS | RESPIRATION RATE: 20 BRPM | HEART RATE: 67 BPM | BODY MASS INDEX: 25.77 KG/M2

## 2021-10-27 LAB
QT INTERVAL: 395 MS
QT INTERVAL: 398 MS
TROPONIN I SERPL-MCNC: 0.06 NG/ML (ref 0–0.6)
TROPONIN T SERPL-MCNC: <0.01 NG/ML (ref 0–0.03)

## 2021-10-27 PROCEDURE — 84484 ASSAY OF TROPONIN QUANT: CPT

## (undated) DEVICE — 6F .070 JR 4 100CM: Brand: CORDIS

## (undated) DEVICE — HI-TORQUE WHISPER ES GUIDE WIRE .014 STRAIGHTTIP 3.0 CM X 190 CM: Brand: HI-TORQUE WHISPER

## (undated) DEVICE — GC 5F 056 XB 3.5 LBT: Brand: BRITE TIP

## (undated) DEVICE — 6F .070 XB 3 100CM: Brand: VISTA BRITE TIP

## (undated) DEVICE — NC TREK CORONARY DILATATION CATHETER 2.5 MM X 12 MM / RAPID-EXCHANGE: Brand: NC TREK

## (undated) DEVICE — CATH DIAG IMPULSE FR4 5F 100CM

## (undated) DEVICE — KT MANIFLD CARDIAC

## (undated) DEVICE — HI-TORQUE BALANCE MIDDLEWEIGHT GUIDE WIRE .014 STRAIGHT TIP 3.0 CM X 190 CM: Brand: HI-TORQUE BALANCE MIDDLEWEIGHT

## (undated) DEVICE — PK CATH CARD 40

## (undated) DEVICE — CATH VENT MIV RADL PIG ST TIP 5F 110CM

## (undated) DEVICE — GLIDESHEATH BASIC HYDROPHILIC COATED INTRODUCER SHEATH: Brand: GLIDESHEATH

## (undated) DEVICE — RUNTHROUGH NS EXTRA FLOPPY PTCA GUIDEWIRE: Brand: RUNTHROUGH

## (undated) DEVICE — TREK CORONARY DILATATION CATHETER 2.50 MM X 15 MM / RAPID-EXCHANGE: Brand: TREK

## (undated) DEVICE — GW EMR FIX EXCHG J STD .035 3MM 260CM

## (undated) DEVICE — CATH DIAG IMPULSE FL3.5 5F 100CM

## (undated) DEVICE — NC TREK CORONARY DILATATION CATHETER 3.5 MM X 20 MM / RAPID-EXCHANGE: Brand: NC TREK